# Patient Record
Sex: MALE | Race: WHITE | HISPANIC OR LATINO | Employment: UNEMPLOYED | URBAN - METROPOLITAN AREA
[De-identification: names, ages, dates, MRNs, and addresses within clinical notes are randomized per-mention and may not be internally consistent; named-entity substitution may affect disease eponyms.]

---
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
## 2024-08-12 ENCOUNTER — APPOINTMENT (OUTPATIENT)
Dept: CT IMAGING | Facility: CLINIC | Age: 19
End: 2024-08-12
Attending: EMERGENCY MEDICINE
Payer: COMMERCIAL

## 2024-08-12 ENCOUNTER — HOSPITAL ENCOUNTER (EMERGENCY)
Facility: CLINIC | Age: 19
Discharge: HOME | End: 2024-08-12
Attending: EMERGENCY MEDICINE | Admitting: EMERGENCY MEDICINE
Payer: COMMERCIAL

## 2024-08-12 ENCOUNTER — APPOINTMENT (OUTPATIENT)
Dept: ULTRASOUND IMAGING | Facility: CLINIC | Age: 19
End: 2024-08-12
Attending: EMERGENCY MEDICINE
Payer: COMMERCIAL

## 2024-08-12 VITALS
WEIGHT: 315 LBS | TEMPERATURE: 99.3 F | DIASTOLIC BLOOD PRESSURE: 106 MMHG | BODY MASS INDEX: 44.1 KG/M2 | HEART RATE: 81 BPM | HEIGHT: 71 IN | SYSTOLIC BLOOD PRESSURE: 196 MMHG | OXYGEN SATURATION: 96 % | RESPIRATION RATE: 18 BRPM

## 2024-08-12 DIAGNOSIS — J18.9 PNEUMONIA DUE TO INFECTIOUS ORGANISM, UNSPECIFIED LATERALITY, UNSPECIFIED PART OF LUNG: ICD-10-CM

## 2024-08-12 DIAGNOSIS — R06.02 SOB (SHORTNESS OF BREATH): ICD-10-CM

## 2024-08-12 LAB
ALBUMIN SERPL BCG-MCNC: 3.7 G/DL (ref 3.5–5.2)
ALP SERPL-CCNC: 96 U/L (ref 40–150)
ALT SERPL W P-5'-P-CCNC: 45 U/L (ref 0–70)
ANION GAP SERPL CALCULATED.3IONS-SCNC: 8 MMOL/L (ref 7–15)
AST SERPL W P-5'-P-CCNC: 25 U/L (ref 0–45)
BASOPHILS # BLD AUTO: 0 10E3/UL (ref 0–0.2)
BASOPHILS NFR BLD AUTO: 0 %
BILIRUB SERPL-MCNC: 0.5 MG/DL
BUN SERPL-MCNC: 9.5 MG/DL (ref 6–20)
CALCIUM SERPL-MCNC: 8.8 MG/DL (ref 8.8–10.4)
CHLORIDE SERPL-SCNC: 104 MMOL/L (ref 98–107)
CREAT SERPL-MCNC: 0.84 MG/DL (ref 0.67–1.17)
D DIMER PPP FEU-MCNC: 0.64 UG/ML FEU (ref 0–0.5)
EGFRCR SERPLBLD CKD-EPI 2021: >90 ML/MIN/1.73M2
EOSINOPHIL # BLD AUTO: 0.1 10E3/UL (ref 0–0.7)
EOSINOPHIL NFR BLD AUTO: 1 %
ERYTHROCYTE [DISTWIDTH] IN BLOOD BY AUTOMATED COUNT: 14 % (ref 10–15)
GLUCOSE SERPL-MCNC: 82 MG/DL (ref 70–99)
HCO3 SERPL-SCNC: 25 MMOL/L (ref 22–29)
HCT VFR BLD AUTO: 35.7 % (ref 40–53)
HGB BLD-MCNC: 11.5 G/DL (ref 13.3–17.7)
IMM GRANULOCYTES # BLD: 0 10E3/UL
IMM GRANULOCYTES NFR BLD: 0 %
LYMPHOCYTES # BLD AUTO: 1.8 10E3/UL (ref 0.8–5.3)
LYMPHOCYTES NFR BLD AUTO: 21 %
MCH RBC QN AUTO: 27 PG (ref 26.5–33)
MCHC RBC AUTO-ENTMCNC: 32.2 G/DL (ref 31.5–36.5)
MCV RBC AUTO: 84 FL (ref 78–100)
MONOCYTES # BLD AUTO: 0.6 10E3/UL (ref 0–1.3)
MONOCYTES NFR BLD AUTO: 7 %
NEUTROPHILS # BLD AUTO: 5.9 10E3/UL (ref 1.6–8.3)
NEUTROPHILS NFR BLD AUTO: 70 %
NRBC # BLD AUTO: 0 10E3/UL
NRBC BLD AUTO-RTO: 0 /100
NT-PROBNP SERPL-MCNC: 926 PG/ML (ref 0–450)
PLATELET # BLD AUTO: 312 10E3/UL (ref 150–450)
POTASSIUM SERPL-SCNC: 4.2 MMOL/L (ref 3.4–5.3)
PROT SERPL-MCNC: 7.2 G/DL (ref 6.4–8.3)
RBC # BLD AUTO: 4.26 10E6/UL (ref 4.4–5.9)
SARS-COV-2 RNA RESP QL NAA+PROBE: NEGATIVE
SODIUM SERPL-SCNC: 137 MMOL/L (ref 135–145)
TROPONIN T SERPL HS-MCNC: <6 NG/L
WBC # BLD AUTO: 8.5 10E3/UL (ref 4–11)

## 2024-08-12 PROCEDURE — 93970 EXTREMITY STUDY: CPT

## 2024-08-12 PROCEDURE — 85025 COMPLETE CBC W/AUTO DIFF WBC: CPT | Performed by: EMERGENCY MEDICINE

## 2024-08-12 PROCEDURE — 85379 FIBRIN DEGRADATION QUANT: CPT | Performed by: EMERGENCY MEDICINE

## 2024-08-12 PROCEDURE — 87635 SARS-COV-2 COVID-19 AMP PRB: CPT | Performed by: EMERGENCY MEDICINE

## 2024-08-12 PROCEDURE — 80053 COMPREHEN METABOLIC PANEL: CPT | Performed by: EMERGENCY MEDICINE

## 2024-08-12 PROCEDURE — 93005 ELECTROCARDIOGRAM TRACING: CPT

## 2024-08-12 PROCEDURE — 99285 EMERGENCY DEPT VISIT HI MDM: CPT | Mod: 25

## 2024-08-12 PROCEDURE — 250N000011 HC RX IP 250 OP 636: Performed by: EMERGENCY MEDICINE

## 2024-08-12 PROCEDURE — 71275 CT ANGIOGRAPHY CHEST: CPT

## 2024-08-12 PROCEDURE — 250N000009 HC RX 250: Performed by: EMERGENCY MEDICINE

## 2024-08-12 PROCEDURE — 36415 COLL VENOUS BLD VENIPUNCTURE: CPT | Performed by: EMERGENCY MEDICINE

## 2024-08-12 PROCEDURE — 83880 ASSAY OF NATRIURETIC PEPTIDE: CPT | Performed by: EMERGENCY MEDICINE

## 2024-08-12 PROCEDURE — 84484 ASSAY OF TROPONIN QUANT: CPT | Performed by: EMERGENCY MEDICINE

## 2024-08-12 RX ORDER — ONDANSETRON 4 MG/1
4 TABLET, ORALLY DISINTEGRATING ORAL EVERY 8 HOURS PRN
Qty: 12 TABLET | Refills: 0 | Status: SHIPPED | OUTPATIENT
Start: 2024-08-12

## 2024-08-12 RX ORDER — KETOROLAC TROMETHAMINE 10 MG/1
10 TABLET, FILM COATED ORAL EVERY 6 HOURS PRN
Qty: 12 TABLET | Refills: 0 | Status: SHIPPED | OUTPATIENT
Start: 2024-08-12

## 2024-08-12 RX ORDER — IOPAMIDOL 755 MG/ML
83 INJECTION, SOLUTION INTRAVASCULAR ONCE
Status: COMPLETED | OUTPATIENT
Start: 2024-08-12 | End: 2024-08-12

## 2024-08-12 RX ORDER — CEFUROXIME AXETIL 500 MG/1
500 TABLET ORAL 2 TIMES DAILY
Qty: 14 TABLET | Refills: 0 | Status: SHIPPED | OUTPATIENT
Start: 2024-08-12 | End: 2024-08-19

## 2024-08-12 RX ORDER — AZITHROMYCIN 250 MG/1
TABLET, FILM COATED ORAL
Qty: 6 TABLET | Refills: 0 | Status: SHIPPED | OUTPATIENT
Start: 2024-08-12 | End: 2024-08-17

## 2024-08-12 RX ADMIN — IOPAMIDOL 83 ML: 755 INJECTION, SOLUTION INTRAVENOUS at 21:29

## 2024-08-12 RX ADMIN — SODIUM CHLORIDE 100 ML: 9 INJECTION, SOLUTION INTRAVENOUS at 21:29

## 2024-08-12 ASSESSMENT — COLUMBIA-SUICIDE SEVERITY RATING SCALE - C-SSRS
2. HAVE YOU ACTUALLY HAD ANY THOUGHTS OF KILLING YOURSELF IN THE PAST MONTH?: YES
1. IN THE PAST MONTH, HAVE YOU WISHED YOU WERE DEAD OR WISHED YOU COULD GO TO SLEEP AND NOT WAKE UP?: NO
BASED ON RESPONSES TO C-SSRS QS 1-6, WHAT IS THE PATIENT'S OVERALL RISK RATING FOR SUICIDE: MODERATE RISK
4. HAVE YOU HAD THESE THOUGHTS AND HAD SOME INTENTION OF ACTING ON THEM?: NO
3. HAVE YOU BEEN THINKING ABOUT HOW YOU MIGHT KILL YOURSELF?: NO
5. HAVE YOU STARTED TO WORK OUT OR WORKED OUT THE DETAILS OF HOW TO KILL YOURSELF? DO YOU INTEND TO CARRY OUT THIS PLAN?: NO
6. HAVE YOU EVER DONE ANYTHING, STARTED TO DO ANYTHING, OR PREPARED TO DO ANYTHING TO END YOUR LIFE?: YES

## 2024-08-12 ASSESSMENT — ACTIVITIES OF DAILY LIVING (ADL)
ADLS_ACUITY_SCORE: 35

## 2024-08-12 NOTE — ED PROVIDER NOTES
"  Emergency Department Note      History of Present Illness     Chief Complaint   Shortness of Breath and Leg Swelling      HPI   Eileen Watt is a 18 year old male who presents to the ED for evaluation of shortness of breath and leg swelling. The patient reports that he has been he has been feeling pain go up both of his legs. He endorses chest pain and abdominal pain. The patient reports there is increased pain when he takes a deep breath. He states that he gets a bad cough when he breaths too quickly. Patient endorses having 2 fevers in the past week but denies currently having a fever. He has dealt with nausea and vomiting. Patient states that he has regular-use medications but has not taken them in a couple of months. He denies having pharyngitis the past couple of days but states that he dealt with pharyngitis for the week before that. He denies smoking, recent travel, or any history of blood clots.     Independent Historian   None    Review of External Notes   I reviewed her office visit from earlier today    Past Medical History     Medical History and Problem List   Major depressive disorder  PTSD  Anxiety    Medications   Desyrel  Adderall  Tessalon    Surgical History   Tonsillectomy    Physical Exam     Patient Vitals for the past 24 hrs:   BP Temp Temp src Pulse Resp SpO2 Height Weight   08/12/24 2213 (!) 196/106 -- -- 81 18 96 % -- --   08/12/24 1553 (!) 173/109 99.3  F (37.4  C) Temporal 89 18 95 % 1.803 m (5' 11\") (!) 226.8 kg (500 lb)     Physical Exam  Constitutional: Vital signs reviewed as above  General: Alert  HEENT: Moist mucous membranes  Eyes: Conjunctiva normal.   Neck: Normal range of motion  Cardiovascular: Regular rate, Regular rhythm and normal heart sounds.  No MRG  Pulmonary/Chest: Effort normal and breath sounds normal. No respiratory distress. Patient has no wheezes. Patient has no rales.   Abdominal: Soft. Positive bowel sounds. No MRG. Mild epigastric tenderness. Abdomen is " obese.   Musculoskeletal/Extremities: Full ROM. Tenderness to calves bilaterally. No obvious unilateral swelling.   Endo: No pitting edema  Neurological: Alert, no focal deficits.  Skin: No erythema or warmth.   Psychiatric: Pleasant     Diagnostics     Lab Results   Labs Ordered and Resulted from Time of ED Arrival to Time of ED Departure   NT PROBNP INPATIENT - Abnormal       Result Value    N terminal Pro BNP Inpatient 926 (*)    D DIMER QUANTITATIVE - Abnormal    D-Dimer Quantitative 0.64 (*)    CBC WITH PLATELETS AND DIFFERENTIAL - Abnormal    WBC Count 8.5      RBC Count 4.26 (*)     Hemoglobin 11.5 (*)     Hematocrit 35.7 (*)     MCV 84      MCH 27.0      MCHC 32.2      RDW 14.0      Platelet Count 312      % Neutrophils 70      % Lymphocytes 21      % Monocytes 7      % Eosinophils 1      % Basophils 0      % Immature Granulocytes 0      NRBCs per 100 WBC 0      Absolute Neutrophils 5.9      Absolute Lymphocytes 1.8      Absolute Monocytes 0.6      Absolute Eosinophils 0.1      Absolute Basophils 0.0      Absolute Immature Granulocytes 0.0      Absolute NRBCs 0.0     COMPREHENSIVE METABOLIC PANEL - Normal    Sodium 137      Potassium 4.2      Carbon Dioxide (CO2) 25      Anion Gap 8      Urea Nitrogen 9.5      Creatinine 0.84      GFR Estimate >90      Calcium 8.8      Chloride 104      Glucose 82      Alkaline Phosphatase 96      AST 25      ALT 45      Protein Total 7.2      Albumin 3.7      Bilirubin Total 0.5     TROPONIN T, HIGH SENSITIVITY - Normal    Troponin T, High Sensitivity <6     COVID-19 VIRUS (CORONAVIRUS) BY PCR - Normal    SARS CoV2 PCR Negative         Imaging   CT Chest Pulmonary Embolism w Contrast   Final Result   IMPRESSION:   1.  No pulmonary embolism.      2.  Multifocal infiltrates in the lungs including groundglass infiltrates and nodular infiltrates compatible with multifocal pneumonia. Mild mediastinal and hilar lymphadenopathy is likely reactive.      3.  Fatty liver.      US  Lower Extremity Venous Duplex Bilateral   Final Result   IMPRESSION:   1.  No deep venous thrombosis in the bilateral lower extremities.          EKG   ECG taken at 16:51, ECG read at 17:04  Normal sinus rhythm   Rate 76 bpm. MN interval 198 ms. QRS duration 82 ms. QT/QTc 386/434 ms. P-R-T axes 18 18 36.    Independent Interpretation   EKG shows sinus rhythm, rate of 76, no acute concerning ST or T wave changes    ED Course      Medications Administered   Medications   iopamidol (ISOVUE-370) solution 83 mL (83 mLs Intravenous $Given 8/12/24 2129)   Saline Flush (100 mLs Intravenous $Given 8/12/24 2129)       Procedures   None     Discussion of Management   None    ED Course   ED Course as of 08/12/24 2305   Mon Aug 12, 2024   1644 I obtained history and examined the patient as noted above.    1922 I rechecked and updated the patient.    1954 I rechecked and updated the patient.    2202 I rechecked and updated the patient.        Additional Documentation  None    Medical Decision Making / Diagnosis     CMS Diagnoses: None      MIPS: CT for PE was ordered because the patient had an abnormal d-dimer.       KARYN Watt is a 18 year old transgender male who presents with pleuritic chest discomfort, shortness of breath and leg pain and swelling.  Just had an EKG and a troponin which were negative at an outside facility.  Certainly PE is a possibility.  She is morbidly obese and does have tenderness to the calves bilaterally.  Fortunately ultrasounds were negative.  A D-dimer was ordered given her risk factors and that did come back elevated.  As such PE study was obtained.  While there is no evidence of PE she does have groundglass opacities concerning for early pneumonia.  Her CBC shows a low hemoglobin 11.5 but is otherwise unremarkable.  Comprehensive metabolic panel is normal.  Troponin is also undetectable making acute course and unlikely.  She does have a slight bump in her BNP to 926 but no obvious  effusions on her CT scan.  Plan to discharge her home on antibiotics for pneumonia.  I will also discharge her with some Toradol and Zofran.  Follow-up as symptoms warrant.    Disposition   The patient was discharged.     Diagnosis     ICD-10-CM    1. Pneumonia due to infectious organism, unspecified laterality, unspecified part of lung  J18.9       2. SOB (shortness of breath)  R06.02            Discharge Medications   Discharge Medication List as of 8/12/2024 10:10 PM        START taking these medications    Details   azithromycin (ZITHROMAX Z-DORON) 250 MG tablet Two tablets on the first day, then one tablet daily for the next 4 days, Disp-6 tablet, R-0, E-Prescribe      cefuroxime (CEFTIN) 500 MG tablet Take 1 tablet (500 mg) by mouth 2 times daily for 7 days, Disp-14 tablet, R-0, E-Prescribe      ketorolac (TORADOL) 10 MG tablet Take 1 tablet (10 mg) by mouth every 6 hours as needed for moderate pain, Disp-12 tablet, R-0, E-Prescribe      ondansetron (ZOFRAN ODT) 4 MG ODT tab Take 1 tablet (4 mg) by mouth every 8 hours as needed for nausea, Disp-12 tablet, R-0, E-Prescribe               Scribe Disclosure:  I, Hernesto Muhammad, am serving as a scribe at 4:42 PM on 8/12/2024 to document services personally performed by Larry Norman MD based on my observations and the provider's statements to me.        Larry Norman MD  08/12/24 5320

## 2024-08-13 LAB
ATRIAL RATE - MUSE: 76 BPM
DIASTOLIC BLOOD PRESSURE - MUSE: NORMAL MMHG
INTERPRETATION ECG - MUSE: NORMAL
P AXIS - MUSE: 18 DEGREES
PR INTERVAL - MUSE: 198 MS
QRS DURATION - MUSE: 82 MS
QT - MUSE: 386 MS
QTC - MUSE: 434 MS
R AXIS - MUSE: 18 DEGREES
SYSTOLIC BLOOD PRESSURE - MUSE: NORMAL MMHG
T AXIS - MUSE: 36 DEGREES
VENTRICULAR RATE- MUSE: 76 BPM

## 2025-04-16 ENCOUNTER — TELEPHONE (OUTPATIENT)
Dept: BEHAVIORAL HEALTH | Facility: CLINIC | Age: 20
End: 2025-04-16

## 2025-04-16 ENCOUNTER — HOSPITAL ENCOUNTER (INPATIENT)
Facility: CLINIC | Age: 20
End: 2025-04-16
Attending: EMERGENCY MEDICINE | Admitting: PSYCHIATRY & NEUROLOGY
Payer: MEDICAID

## 2025-04-16 DIAGNOSIS — F33.41 RECURRENT MAJOR DEPRESSIVE DISORDER, IN PARTIAL REMISSION: ICD-10-CM

## 2025-04-16 DIAGNOSIS — R45.851 SUICIDAL IDEATION: ICD-10-CM

## 2025-04-16 DIAGNOSIS — F41.9 ANXIETY: Primary | ICD-10-CM

## 2025-04-16 PROCEDURE — 250N000013 HC RX MED GY IP 250 OP 250 PS 637: Performed by: EMERGENCY MEDICINE

## 2025-04-16 PROCEDURE — 99285 EMERGENCY DEPT VISIT HI MDM: CPT | Performed by: EMERGENCY MEDICINE

## 2025-04-16 RX ORDER — OLANZAPINE 10 MG/1
10 TABLET, ORALLY DISINTEGRATING ORAL ONCE
Status: COMPLETED | OUTPATIENT
Start: 2025-04-16 | End: 2025-04-16

## 2025-04-16 RX ORDER — HYDROXYZINE HYDROCHLORIDE 25 MG/1
25 TABLET, FILM COATED ORAL EVERY 4 HOURS PRN
Status: DISCONTINUED | OUTPATIENT
Start: 2025-04-16 | End: 2025-04-17

## 2025-04-16 RX ORDER — OLANZAPINE 10 MG/1
10 TABLET, ORALLY DISINTEGRATING ORAL 2 TIMES DAILY PRN
Status: DISCONTINUED | OUTPATIENT
Start: 2025-04-16 | End: 2025-04-17

## 2025-04-16 RX ADMIN — OLANZAPINE 10 MG: 10 TABLET, ORALLY DISINTEGRATING ORAL at 23:44

## 2025-04-16 ASSESSMENT — COLUMBIA-SUICIDE SEVERITY RATING SCALE - C-SSRS
2. HAVE YOU ACTUALLY HAD ANY THOUGHTS OF KILLING YOURSELF IN THE PAST MONTH?: YES
3. HAVE YOU BEEN THINKING ABOUT HOW YOU MIGHT KILL YOURSELF?: YES
5. HAVE YOU STARTED TO WORK OUT OR WORKED OUT THE DETAILS OF HOW TO KILL YOURSELF? DO YOU INTEND TO CARRY OUT THIS PLAN?: YES
1. IN THE PAST MONTH, HAVE YOU WISHED YOU WERE DEAD OR WISHED YOU COULD GO TO SLEEP AND NOT WAKE UP?: YES
4. HAVE YOU HAD THESE THOUGHTS AND HAD SOME INTENTION OF ACTING ON THEM?: NO
6. HAVE YOU EVER DONE ANYTHING, STARTED TO DO ANYTHING, OR PREPARED TO DO ANYTHING TO END YOUR LIFE?: YES

## 2025-04-16 ASSESSMENT — ACTIVITIES OF DAILY LIVING (ADL)
ADLS_ACUITY_SCORE: 41

## 2025-04-17 ENCOUNTER — TELEPHONE (OUTPATIENT)
Dept: BEHAVIORAL HEALTH | Facility: CLINIC | Age: 20
End: 2025-04-17

## 2025-04-17 VITALS
HEIGHT: 71 IN | TEMPERATURE: 97.7 F | DIASTOLIC BLOOD PRESSURE: 83 MMHG | WEIGHT: 315 LBS | RESPIRATION RATE: 18 BRPM | SYSTOLIC BLOOD PRESSURE: 137 MMHG | BODY MASS INDEX: 44.1 KG/M2 | OXYGEN SATURATION: 95 % | HEART RATE: 121 BPM

## 2025-04-17 PROBLEM — F32.A DEPRESSION: Status: ACTIVE | Noted: 2025-04-17

## 2025-04-17 PROBLEM — R45.851 SUICIDAL IDEATION: Status: ACTIVE | Noted: 2025-04-17

## 2025-04-17 LAB
AMPHETAMINES UR QL SCN: NORMAL
BARBITURATES UR QL SCN: NORMAL
BENZODIAZ UR QL SCN: NORMAL
BZE UR QL SCN: NORMAL
CANNABINOIDS UR QL SCN: NORMAL
CHOLEST SERPL-MCNC: 153 MG/DL
EST. AVERAGE GLUCOSE BLD GHB EST-MCNC: 120 MG/DL
FENTANYL UR QL: NORMAL
HBA1C MFR BLD: 5.8 %
HDLC SERPL-MCNC: 48 MG/DL
LDLC SERPL CALC-MCNC: 84 MG/DL
NONHDLC SERPL-MCNC: 105 MG/DL
OPIATES UR QL SCN: NORMAL
PCP QUAL URINE (ROCHE): NORMAL
TRIGL SERPL-MCNC: 107 MG/DL

## 2025-04-17 PROCEDURE — 250N000013 HC RX MED GY IP 250 OP 250 PS 637: Performed by: CLINICAL NURSE SPECIALIST

## 2025-04-17 PROCEDURE — 99223 1ST HOSP IP/OBS HIGH 75: CPT | Performed by: CLINICAL NURSE SPECIALIST

## 2025-04-17 PROCEDURE — 80307 DRUG TEST PRSMV CHEM ANLYZR: CPT | Performed by: EMERGENCY MEDICINE

## 2025-04-17 PROCEDURE — 80061 LIPID PANEL: CPT | Performed by: PSYCHIATRY & NEUROLOGY

## 2025-04-17 PROCEDURE — 36415 COLL VENOUS BLD VENIPUNCTURE: CPT | Performed by: PSYCHIATRY & NEUROLOGY

## 2025-04-17 PROCEDURE — 83036 HEMOGLOBIN GLYCOSYLATED A1C: CPT | Performed by: PSYCHIATRY & NEUROLOGY

## 2025-04-17 PROCEDURE — 128N000002 HC R&B CD/MH ADOLESCENT

## 2025-04-17 RX ORDER — HYDROXYZINE HYDROCHLORIDE 25 MG/1
25 TABLET, FILM COATED ORAL EVERY 4 HOURS PRN
Status: DISCONTINUED | OUTPATIENT
Start: 2025-04-17 | End: 2025-04-17

## 2025-04-17 RX ORDER — OLANZAPINE 5 MG/1
5-10 TABLET, FILM COATED ORAL 3 TIMES DAILY PRN
Status: DISCONTINUED | OUTPATIENT
Start: 2025-04-17 | End: 2025-04-21 | Stop reason: HOSPADM

## 2025-04-17 RX ORDER — AMOXICILLIN 250 MG
1 CAPSULE ORAL 2 TIMES DAILY PRN
Status: DISCONTINUED | OUTPATIENT
Start: 2025-04-17 | End: 2025-04-21 | Stop reason: HOSPADM

## 2025-04-17 RX ORDER — OLANZAPINE 10 MG/1
10 TABLET, FILM COATED ORAL 3 TIMES DAILY PRN
Status: DISCONTINUED | OUTPATIENT
Start: 2025-04-17 | End: 2025-04-17

## 2025-04-17 RX ORDER — MAGNESIUM HYDROXIDE/ALUMINUM HYDROXICE/SIMETHICONE 120; 1200; 1200 MG/30ML; MG/30ML; MG/30ML
30 SUSPENSION ORAL EVERY 4 HOURS PRN
Status: DISCONTINUED | OUTPATIENT
Start: 2025-04-17 | End: 2025-04-21 | Stop reason: HOSPADM

## 2025-04-17 RX ORDER — HYDROXYZINE HYDROCHLORIDE 25 MG/1
25-50 TABLET, FILM COATED ORAL EVERY 4 HOURS PRN
Status: DISCONTINUED | OUTPATIENT
Start: 2025-04-17 | End: 2025-04-21 | Stop reason: HOSPADM

## 2025-04-17 RX ORDER — ESCITALOPRAM OXALATE 10 MG/1
10 TABLET ORAL DAILY
Status: DISCONTINUED | OUTPATIENT
Start: 2025-04-17 | End: 2025-04-21 | Stop reason: HOSPADM

## 2025-04-17 RX ORDER — OLANZAPINE 10 MG/2ML
10 INJECTION, POWDER, FOR SOLUTION INTRAMUSCULAR 3 TIMES DAILY PRN
Status: DISCONTINUED | OUTPATIENT
Start: 2025-04-17 | End: 2025-04-17

## 2025-04-17 RX ORDER — OLANZAPINE 10 MG/2ML
10 INJECTION, POWDER, FOR SOLUTION INTRAMUSCULAR 3 TIMES DAILY PRN
Status: DISCONTINUED | OUTPATIENT
Start: 2025-04-17 | End: 2025-04-21 | Stop reason: HOSPADM

## 2025-04-17 RX ORDER — TRAZODONE HYDROCHLORIDE 50 MG/1
50 TABLET ORAL
Status: DISCONTINUED | OUTPATIENT
Start: 2025-04-17 | End: 2025-04-21 | Stop reason: HOSPADM

## 2025-04-17 RX ORDER — ACETAMINOPHEN 325 MG/1
650 TABLET ORAL EVERY 4 HOURS PRN
Status: DISCONTINUED | OUTPATIENT
Start: 2025-04-17 | End: 2025-04-21 | Stop reason: HOSPADM

## 2025-04-17 RX ADMIN — ESCITALOPRAM OXALATE 10 MG: 10 TABLET ORAL at 14:06

## 2025-04-17 RX ADMIN — HYDROXYZINE HYDROCHLORIDE 25 MG: 25 TABLET, FILM COATED ORAL at 19:37

## 2025-04-17 ASSESSMENT — ACTIVITIES OF DAILY LIVING (ADL)
ADLS_ACUITY_SCORE: 41
ADLS_ACUITY_SCORE: 15
ADLS_ACUITY_SCORE: 15
ADLS_ACUITY_SCORE: 41
ADLS_ACUITY_SCORE: 41
ADLS_ACUITY_SCORE: 15
DRESS: STREET CLOTHES
ADLS_ACUITY_SCORE: 41
ADLS_ACUITY_SCORE: 15
ADLS_ACUITY_SCORE: 41
ADLS_ACUITY_SCORE: 15
ADLS_ACUITY_SCORE: 41
ADLS_ACUITY_SCORE: 15
ADLS_ACUITY_SCORE: 41
ADLS_ACUITY_SCORE: 15
ADLS_ACUITY_SCORE: 41
ADLS_ACUITY_SCORE: 15
ADLS_ACUITY_SCORE: 41
ADLS_ACUITY_SCORE: 15
ADLS_ACUITY_SCORE: 15
ADLS_ACUITY_SCORE: 41
ADLS_ACUITY_SCORE: 15

## 2025-04-17 NOTE — ED TRIAGE NOTES
Pt endorses si with a plan- had pesticides in door dash cart that she was planning to drink. Started scratching self with a fork yesterday in places not visible- no open areas.  States she has a lot of stressor in life- is unemployed and lost a lot of friends. Doesn't have insurance and can't afford meds     Triage Assessment (Adult)       Row Name 04/16/25 1956          Triage Assessment    Airway WDL WDL        Respiratory WDL    Respiratory WDL WDL        Skin Circulation/Temperature WDL    Skin Circulation/Temperature WDL WDL        Peripheral/Neurovascular WDL    Peripheral Neurovascular WDL WDL        Cognitive/Neuro/Behavioral WDL    Cognitive/Neuro/Behavioral WDL mood/behavior     Mood/Behavior sad;anxious

## 2025-04-17 NOTE — CONSULTS
"Diagnostic Evaluation Consultation  Crisis Assessment    Patient Name: Eileen Watt  Age:  19 year old  Legal Sex: male  Gender Identity: male  Pronouns:   Race: White  Ethnicity:  or   Language: English      Patient was assessed: In person   Crisis Assessment Start Date: 25  Crisis Assessment Start Time:   Crisis Assessment Stop Time:   Patient location: [facility]                             [id number]    Referral Data and Chief Complaint  Eileen Watt presents to the ED with family/friends. Patient is presenting to the ED for the following concerns: Suicidal ideation, Depression, Anxiety. Factors that make the mental health crisis life threatening or complex are: Patient brought in by mother due to SI with plans and intent to ingest pesticides.  Patient researched and added pesticides to her door dash cart, but called her mother to take her in.  Patient's last suicide attempt was 6 years ago.  Patient performed NSSI by scratching arm with a plastic knife.  Patient denied HI.  Patient was mildly anxious, but friendly  and cooperative.  She was not aggressive. She said, \"I'm afraid of suffering and I want a quick way to end it.  I got distracted for a moment by a bunch of drama going on with friends.  Two of my friends have been together, but they didn't tell me. My girlfriend of two years broke up with me almost two weeks ago.  I lost my job and I'm on unemployment that's about to run out.  I'm worried about money.  I'm stressed about the state of the world.  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  I haven't been able to afford mental health care.  My dad , last year.  I entire life has been a series of horrible things, most of which are my fault.\"  Patient has not been sleeping well.  She has nightmares and flashbacks of things related to her father; as well as, other scary prospects.  Patient did not have psychosis symptoms.  She feels increased " paranoia, especially about bugs and rodents.  Patient has not been eating well.  She weighs 500 pounds.  She has many aches and pains.  Her insight, judgment, and impulse control were impaired..      Informed Consent and Assessment Methods  Explained the crisis assessment process, including applicable information disclosures and limits to confidentiality, assessed understanding of the process, and obtained consent to proceed with the assessment.  Assessment methods included conducting a formal interview with patient, review of medical records, collaboration with medical staff, and obtaining relevant collateral information from family and community providers when available.  : done     History of the Crisis   Patient had prior diagnoses of Depression, ADHD, and mom said a history of Delusional d/o.  Patient has not been taking meds for over one hour.    Brief Psychosocial History  Family:  Single, Children no  Support System:  Parent(s), Friend  Employment Status:  unemployed  Source of Income:  unemployment  Financial Environmental Concerns:  unemployed  Current Hobbies:  social media/computer activities  Barriers in Personal Life:  mental health concerns    Significant Clinical History  Current Anxiety Symptoms:  panic attack, racing thoughts, excessive worry, shortness of breath or racing heart, anxious  Current Depression/Trauma:  avoidance, sense of doom, difficulty concentrating, withdrawl/isolation, negativistic, crying or feels like crying, low self esteem, impaired decision making, helplessness, hopelessness, sadness, excessive guilt, thoughts of death/suicide  Current Somatic Symptoms:  sweating, flushing, shaking, somatic symptoms (abdominal pain, headache, tension), racing thoughts, excessive worry, shortness of breath or racing heart, anxious  Current Psychosis/Thought Disturbance:  forgetful, impulsive, inattentive, displaces blame, distractability  Current Eating Symptoms:  loss of appetite, increased  "appetite, recent weight gain, binging  Chemical Use History:  Alcohol: None  Benzodiazepines: None  Opiates: None  Cocaine: None  Marijuana: None  Other Use: None   Past diagnosis:  ADHD, Depression  Family history:  Depression  Past treatment:  Individual therapy, Primary Care, Inpatient Hospitalization, Partial Hospitalization, Day Treatment  Details of most recent treatment:  Patient has not been on meds or seeing a therapist since 8/2024.  Other relevant history:  Patient lives alone.  She reportedly does better, when she lives with others.    Have there been any medication changes in the past two weeks:  patient is not on psychiatric meds       Is the patient compliant with medications:  no        Collateral Information  Is there collateral information: Yes     Collateral information name, relationship, phone number:  Shannan Watt, mother, [phone], in person    What happened today: \"Shelley is depessed.  This is her last month of unemployment.  Her weight goes up.  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  She's too tired and depressed to cook.  She'll go days without eating. I think she might have Autism and Lalo Danlos symptoms.\"     What is different about patient's functioning: Mom said, \"I've been worried about her since she was 13 years old.  The last year has been hard.  She has nowhere to go.  She can only be at my house, about two days.  She's so sad and lonely.\"     What do you think the patient needs:  admission    Has patient made comments about wanting to kill themselves/others: yes    If d/c is recommended, can they take part in safety/aftercare planning:  yes    Additional collateral information:        Risk Assessment  [place] Suicide Severity Rating Scale Full Clinical Version:  Suicidal Ideation  Q1 Wish to be Dead (Lifetime): Yes  Q2 Non-Specific Active Suicidal Thoughts (Lifetime): Yes  3. Active Suicidal Ideation with any Methods (Not Plan) Without Intent to Act (Lifetime): " Yes  4. Active Suicidal Ideation with Some Intent to Act, Without Specific Plan (Lifetime): Yes  5. Active Suicidal Ideation with Specific Plan and Intent (Lifetime): Yes  Q6 Suicide Behavior (Lifetime): yes  Intensity of Ideation (Lifetime)  Most Severe Ideation Rating (Lifetime): 5  Frequency (Lifetime): Many times each day  Duration (Lifetime): Less than 1 hour/some of the time  Controllability (Lifetime): Unable to control thoughts  Deterrents (Lifetime): Deterrents most likely did not stop you  Reasons for Ideation (Lifetime): Completely to end or stop the pain (You couldn't go on living with the pain or how you were feeling)  Suicidal Behavior (Lifetime)  Actual Attempt (Lifetime): Yes  Total Number of Actual Attempts (Lifetime): 1  Actual Attempt Description (Lifetime): 6 years ago  Has subject engaged in non-suicidal self-injurious behavior? (Lifetime): Yes  Interrupted Attempts (Lifetime): No  Aborted or Self-Interrupted Attempt (Lifetime): No  Preparatory Acts or Behavior (Lifetime): Yes    [place] Suicide Severity Rating Scale Recent:   Suicidal Ideation (Recent)  Q1 Wished to be Dead (Past Month): yes  Q2 Suicidal Thoughts (Past Month): yes  Q3 Suicidal Thought Method: yes  Q4 Suicidal Intent without Specific Plan: yes  Q5 Suicide Intent with Specific Plan: yes  If yes to Q6, within past 3 months?: no  Level of Risk per Screen: high risk  Intensity of Ideation (Recent)  Most Severe Ideation Rating (Past 1 Month): 5  Frequency (Past 1 Month): Many times each day  Duration (Past 1 Month): 1-4 hours/a lot of time  Controllability (Past 1 Month): Unable to control thoughts  Deterrents (Past 1 Month): Deterrents most likely did not stop you  Reasons for Ideation (Past 1 Month): Completely to end or stop the pain (You couldn't go on living with the pain or how you were feeling)  Suicidal Behavior (Recent)  Actual Attempt (Past 3 Months): No  Has subject engaged in non-suicidal self-injurious behavior? (Past 3  Months): Yes  Interrupted Attempts (Past 3 Months): No  Aborted or Self-Interrupted Attempt (Past 3 Months): No  Preparatory Acts or Behavior (Past 3 Months): Yes    Environmental or Psychosocial Events: challenging interpersonal relationships, other life stressors, excessive debt, poor finances, unemployment/underemployment, helplessness/hopelessness, social isolation  Protective Factors: Protective Factors: strong bond to family unit, community support, or employment, lives in a responsibly safe and stable environment, help seeking    Does the patient have thoughts of harming others? Feels Like Hurting Others: no  Previous Attempt to Hurt Others: no  Is the patient engaging in sexually inappropriate behavior?: no  Does Patient have a known history of aggressive behavior: No  Has aggression occurred as a result of MH concerns/diagnosis: no  Does patient have history of aggression in hospital: no    Is the patient engaging in sexually inappropriate behavior?  no        Mental Status Exam   Affect: Appropriate, Flat, Blunted  Appearance: Appropriate, Disheveled  Attention Span/Concentration: Attentive  Eye Contact: Variable, Engaged    Fund of Knowledge: Appropriate   Language /Speech Content: Fluent  Language /Speech Volume: Normal  Language /Speech Rate/Productions: Normal  Recent Memory: Intact  Remote Memory: Intact  Mood: Anxious, Depressed, Sad  Orientation to Person: Yes   Orientation to Place: Yes  Orientation to Time of Day: Yes  Orientation to Date: Yes     Situation (Do they understand why they are here?): Yes  Psychomotor Behavior: Normal  Thought Content: Suicidal, Paranoia  Thought Form: Intact, Paranoia     Medication  Psychotropic medications:   Medication Orders - Psychiatric (From admission, onward)      Start     Dose/Rate Route Frequency Ordered Stop    04/16/25 2329  OLANZapine zydis (zyPREXA) ODT tab 10 mg         10 mg Oral 2 TIMES DAILY PRN 04/16/25 2329 04/16/25 2329  hydrOXYzine HCl  (ATARAX) tablet 25 mg         25 mg Oral EVERY 4 HOURS PRN 04/16/25 9366               Current Care Team  Patient Care Team:  No Ref-Primary, Physician as PCP - General    Diagnosis  Patient Active Problem List   Diagnosis Code    Depression F32.A       Primary Problem This Admission  Active Hospital Problems    *Depression        Clinical Summary and Substantiation of Recommendations   Clinical Substantiation:  It is the recommendation of this clinician that pt admit to IP MH for safety and stabilization. Pt displays the following risk factors that support IP admission: Si with plans and intent for ingestion of pesticides. Pt is unable to engage in safety planning to mitigate risk level in a non-secure setting. Lower levels of care would not be sufficient in managing the level of risk pt is presenting with. Due to this IP is the least restrictive option of care for pt. Pt should remain in IP until deemed safe to return to the community and engage in OP  supports. Pt will need assistance establishing OP  services prior to discharge.    Goals for crisis stabilization:  cessation of SI    Next steps for Care Team:  Psych consult    Treatment Objectives Addressed:  rapport building, assessing safety, identifying treatment goals, identifying additional supports, processing feelings, exploring obstacles to safety in the community    Therapeutic Interventions:  Engaged in safety planning, Engaged in guided discovery, explored patient's perspectives and helped expand them through socratic dialogue.    Has a specific means been identified for suicidal/homicide actions: Yes    If yes, describe:  pesticide injestion    Explain action steps toward mitigation:  admission    Document completion of mitigation actions:       The follow up action still needed prior to discharge:  secure substances, upon discharge    Patient coping skills attempted to reduce the crisis:  Talking to friends    Disposition  Recommended referrals:  Other. please comment (inpatient mental health)        Reviewed case and recommendations with attending provider. Attending Name: Moises Sparks MD       Attending concurs with disposition: yes       Patient and/or validated legal guardian concurs with disposition:   yes       Final disposition:  inpatient mental health         Imminent risk of harm: Suicidal Behavior  Severe psychiatric, behavioral or other comorbid conditions are appropriate for management at inpatient mental health as indicated by at least one of the following: Psychiatric Symptoms, Impaired impulse control, judgement, or insight, Symptoms of impact to function  Severe dysfunction in daily living is present as indicated by at least one of the following: Extreme deterioration in social interactions  Situation and expectations are appropriate for inpatient care: Voluntary treatment at lower level of care is not feasible  Inpatient mental health services are necessary to meet patient needs and at least one of the following: Specific condition related to admission diagnosis is present and judged likely to further improve at proposed level of care, Specific condition related to admission diagnosis is present and judged likely to deteriorate in absence of treatment at proposed level of care      Legal status: Voluntary/Patient has signed consent for treatment                                                                                                                                 Reviewed court records: yes       Assessment Details   Total duration spent with the patient: 60 min     CPT code(s) utilized: 92703 - Psychotherapy for Crisis - 60 (30-74*) min    ARTI Thompson, Psychotherapist  [facility] - Triage & Transition Services  Callback: [phone]
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
## 2025-04-17 NOTE — PLAN OF CARE
Goal Outcome Evaluation:    Pt was met in the room   Cooperative yet restricted   Flat/blunted affect with minimal eye contact   Pt stated being admitted for suicidal ideation. Denied any active SI thoughts at the time of check in. Pt contracts for safety on the unit   Endorsed some anxiety due to being admitted and finds comfort staying in the room    Ate 100% of dinner in the room  Encouraged to be present in the milieu though was not as receptive.      Pt denied any pain or medical concern   There is a urine drug screen ordered-cup given to patient. Sent down to lab    Pt did not have any scheduled medication this evening   Encouraged to utilize PRN when needed. PRN Hydroxyzine 25 mg given per request

## 2025-04-17 NOTE — PROGRESS NOTES
04/17/25 1238   Patient Belongings   Did you bring any home meds/supplements to the hospital?  No   Patient Belongings sent to security per site process;locker;remains with patient   Patient Belongings Remaining with Patient clothing   Patient Belongings Put in Hospital Secure Location (Security or Locker, etc.) cash/credit card;money (see comment);purse/wallet;cell phone/electronics;shoes   Belongings Search Yes   Clothing Search Yes   Second Staff Lena SALDIVAR     Android phone  Slipper shoes  Long sleeve shirt  Underwear   Pants  Phone case wallet with ID  Purse with: 4 bus cards, $3.00, 3 chapsticks, 1 pen, guitar pick    Sent to Security:  [facility] card [id number]  Visa [phone]  EBT [id number]  [facility] card [id number]  [place] [phone]    ..A               Admission:  I am responsible for any personal items that are not sent to the safe or pharmacy.  [facility] is not responsible for loss, theft or damage of any property in my possession.    Signature:  _________________________________ Date: _______  Time: _____                                              Staff Signature:  ____________________________ Date: ________  Time: _____      2nd Staff person, if patient is unable/unwilling to sign:    Signature: ________________________________ Date: ________  Time: _____     Discharge:  [facility] has returned all of my personal belongings:    Signature: _________________________________ Date: ________  Time: _____                                          Staff Signature:  ____________________________ Date: ________  Time: _____

## 2025-04-17 NOTE — TELEPHONE ENCOUNTER
S: [facility] Antony , DEC  Keely  calling at 11:24 PM about 19 year old/male To F  presenting with SI with a plan      B: Pt arrived via Family. Presenting problem, stressors: Pt has SI with a plan to order pesticides through door dash. Pt's stressors are a recent breakup and job loss.     Pt affect in ED: Cooperative   Pt Dx: Major Depressive Disorder and ADHD  Previous IPMH hx? Yes: 6 years ago   Pt endorses SI with a plan to use pesticides     Hx of suicide attempt? Yes: 6 years ago   Pt endorses SIB via scratching , most recent episode 24 hours ago   Pt denies HI   Pt denies hallucinations .   Pt RARS Score: 3    Hx of aggression/violence, sexual offenses, legal concerns, Epic care plan? describe: n/a   Current concerns for aggression this visit? No  Does pt have a history of Civil Commitment? No  Is Pt their own guardian? Yes    Pt is not prescribed medication. Is patient medication compliant? N/A  Pt denies OP services   CD concerns: None  Acute or chronic medical concerns: 500 lbs   Does Pt present with specific needs, assistive devices, or exclusionary criteria? None      Pt is ambulatory  Pt is able to perform ADLs independently      A: Pt to be reviewed for [facility] admission. Pt is Voluntary  Preferred placement: [facility]    COVID Symptoms: No  If yes, COVID test required   Utox: Ordered, not yet collected   CMP: Not ordered, intake requested lab  CBC: Not ordered, intake requested lab  HCG: N/A    R: Patient cleared and ready for behavioral bed placement: Yes  Pt placed on IP worklist? Yes    Does Patient need a Transfer Center request created? No, Pt is located within [facility] ED, [facility] ED, or [facility] ED

## 2025-04-17 NOTE — TELEPHONE ENCOUNTER
R: Patient cleared and ready for behavioral bed placement: Yes    12:57 AM Intake called [facility] and spoke with pt coordinator, Becca.    1:30 AM Intake received a call from Dr. Tres Escamilla, accepting this pt to [facility](Sy[facility]).     1:41 AM Indicia complete.     1:49 AM Intake called st [facility] and provided disposition to Subha STEARNS. Nurse report: Charge has contacted ANS due to pt's BP. ANS went to ED and spoke with the provider to work on stabilization of pt's BP. Per ANS, ED provider will do a medical consult to figure out a plan and get the pt on medications. Additionally, the charge is requiring the pt to have an SIO due to SIB via scratching, last event was less than 24 hours ago.     1:51 AM Intake called [facility] ED and provided placement information to RNRupal.                 R: [place] [facility] Access Inpatient Bed Call Log  4/17/2025 12:04 AM  Intake has called facilities that have not updated their bed status within the last 12 hours.    Adults:    *[facility]:  [place] -- [facility]: @ CAPACITY.  [facility]-[id number]: @ CAPACITY. Reporting no reviews overnight.   [facility]- [id number]: @ CAPACITY. Low acuity   [facility]- [id number]: @ CAPACITY. Low acuity only -12:06 AM Per Abdiaziz, they can review low acuity.  [place] -- [facility]- [phone]: @ CAPACITY.   [facility]- [id number]: @ CAPACITY.   [facility]/ beds- [id number]: @ CAPACITY. Ages 18-35, Voluntary only, NO aggression/physical/sexual assault, violence hx or drug abuse, or psychosis. Negative Covid. -12:06 AM Per Leigh YA: 2, Adol: Can review for discharges tomorrow, Child: 0.   [facility]- [phone]: @ CAPACITY.  [phone] @ [facility].  [place] -- [facility]- [phone]: @ POSTING 1 BEDS. Do not review overnight.     Pt remains on waitlist pending appropriate placement availability.

## 2025-04-17 NOTE — PROGRESS NOTES
"S) 19-yr-old  male that prefers She/her pronouns voluntarily admitted to 6A Young Adult for psychiatric stabilization     B) Per DEC , Keely Puri [facility] , Licensed Social Worker on 2025  9:08 PM:     Eileen Watt presents to the ED with family/friends. Patient is presenting to the ED for the following concerns: Suicidal ideation, Depression, Anxiety. Factors that make the mental health crisis life threatening or complex are: Patient brought in by mother due to SI with plans and intent to ingest pesticides.  Patient researched and added pesticides to her door dash cart, but called her mother to take her in.  Patient's last suicide attempt was 6 years ago.  Patient performed NSSI by scratching arm with a plastic knife.  Patient denied HI.  Patient was mildly anxious, but friendly  and cooperative.  She was not aggressive. She said, \"I'm afraid of suffering and I want a quick way to end it.  I got distracted for a moment by a bunch of drama going on with friends.  Two of my friends have been together, but they didn't tell me. My girlfriend of two years broke up with me almost two weeks ago.  I lost my job and I'm on unemployment that's about to run out.  I'm worried about money.  I'm stressed about the state of the world.  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  I haven't been able to afford mental health care.  My dad , last year.  I entire life has been a series of horrible things, most of which are my fault.\"  Patient has not been sleeping well.  She has nightmares and flashbacks of things related to her father; as well as, other scary prospects.  Patient did not have psychosis symptoms.  She feels increased paranoia, especially about bugs and rodents.  Patient has not been eating well.  She weighs 500 pounds.  She has many aches and pains.  Her insight, judgment, and impulse control were impaired.     Patient had prior diagnoses of Depression, ADHD, and mom said a history " "of Delusional d/o.  Patient has not been taking meds for over one hour.     Significant Clinical History     Current Anxiety Symptoms:  panic attack, racing thoughts, excessive worry, shortness of breath or racing heart, anxious     Current Depression/Trauma:  avoidance, sense of doom, difficulty concentrating, withdrawl/isolation, negativistic, crying or feels like crying, low self esteem, impaired decision making, helplessness, hopelessness, sadness, excessive guilt, thoughts of death/suicide     Current Somatic Symptoms:  sweating, flushing, shaking, somatic symptoms (abdominal pain, headache, tension), racing thoughts, excessive worry, shortness of breath or racing heart, anxious     Current Psychosis/Thought Disturbance:  forgetful, impulsive, inattentive, displaces blame, distractability     Current Eating Symptoms:  loss of appetite, increased appetite, recent weight gain, binging     Chemical Use History:  Alcohol: None     Past diagnosis:  ADHD, Depression     Family history:  Depression     Past treatment:  Individual therapy, Primary Care, Inpatient Hospitalization, Partial Hospitalization, Day Treatment     Details of most recent treatment: Patient has not been on meds or seeing a therapist since 8/2024.     Other relevant history:  Patient lives alone.  She reportedly does better, when she lives with others.      Have there been any medication changes in the past two weeks:  patient is not on psychiatric meds         Collateral information name, relationship, phone number:  Shannan Watt, mother, [phone], in person      What happened today: \"Shelley is depressed.  This is her last month of unemployment.  Her weight goes up.  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  She's too tired and depressed to cook.  She'll go days without eating. I think she might have Autism and Lalo Danlos symptoms.\"       What is different about patient's functioning: Mom said, \"I've been worried about her since she " "was 13 years old.  The last year has been hard.  She has nowhere to go.  She can only be at my house, about two days.  She's so sad and lonely.\"       What do you think the patient needs:  admission      Has patient made comments about wanting to kill themselves/others: yes     Clinical Summary and Substantiation of Recommendations      Clinical Substantiation:  It is the recommendation of this clinician that pt admit to IP MH for safety and stabilization. Pt displays the following risk factors that support IP admission: Si with plans and intent for ingestion of pesticides. Pt is unable to engage in safety planning to mitigate risk level in a non-secure setting. Lower levels of care would not be sufficient in managing the level of risk pt is presenting with. Due to this IP is the least restrictive option of care for pt. Pt should remain in IP until deemed safe to return to the community and engage in OP MH supports. Pt will need assistance establishing OP MH services prior to discharge.     Goals for crisis stabilization:  cessation of SI     A) Pt who prefers to be called  Shelley  arrives to the unit at 1145 from the [facility] ED; pt ambulates independently, presents as depressed, unkempt, flat/sad affect, hesitant, currently endorses anxiety and SI thoughts only-contracts for safety while in the hospital, \"I'm not going to do anything in the hospital, that's why I am here,\" Pt denies any hallucinations, allergies to medications, denies any physical pain. Last alcohol use was 12/6/2024-\"stopped because my ankles were swelling,\" pt denies any nicotine or other chemical use. Pt denies any medical concerns.  VSS, cooperative with the search; due to obesity, pt unable to wear 5x size scrub pants, provider consulted for order for pt to wear own pants. No open areas, bruises, rashes or any other NSSIB marks or scars; \"I get cysts on my inner thighs sometimes but okay now,\" pt responds. Pt has labs drawn and informed of " "needed urine sample. Pt alert and oriented x 4, struggling with negativity and intrusive thoughts, isolating and not taking care of self. Pt denies any concerns with anger, \"stuff my feelings or shut down,\" pt elaborates. Pt given tour of room, unit and meets with provider/CTC.    R) Monitor closely to ensure safely adjusting to unit, no roommate, room close to nursing desk/lounge, 15-minute checks, safety bedding and suicide, self injury and sexual precautions for safety      2:46 PM  Pt likes to play musical instruments, guitar, starts Lexapro 10 mg without incident today, appetite 100% and drinking fluids; pt given journal, fidget, word finds/crossword sheets and is currently doing her laundry. ANS is looking for larger scrub attire for pt. Okay for pt to wear own clothes per provider due to obesity and bariatric size scrubs unavailable.  "

## 2025-04-17 NOTE — PLAN OF CARE
Eileen Watt  April 17, 2025  Plan of Care Hand-off Note     Patient Recommended Care Path: inpatient mental health    Clinical Substantiation:  It is the recommendation of this clinician that pt admit to IP MH for safety and stabilization. Pt displays the following risk factors that support IP admission: Si with plans and intent for ingestion of pesticides. Pt is unable to engage in safety planning to mitigate risk level in a non-secure setting. Lower levels of care would not be sufficient in managing the level of risk pt is presenting with. Due to this IP is the least restrictive option of care for pt. Pt should remain in IP until deemed safe to return to the community and engage in OP  supports. Pt will need assistance establishing OP MH services prior to discharge.    Goals for crisis stabilization:  cessation of SI    Next steps for Care Team:  Psych consult    Treatment Objectives Addressed:  rapport building, assessing safety, identifying treatment goals, identifying additional supports, processing feelings, exploring obstacles to safety in the community    Therapeutic Interventions:  Engaged in safety planning, Engaged in guided discovery, explored patient's perspectives and helped expand them through socratic dialogue.    Has a specific means been identified for suicidal.homicide actions: Yes  If yes, describe: pesticide injestion  Explain action steps toward mitigation: admission  Document completion of mitigation action:    The follow up action still needed prior to discharge: secure substances, upon discharge    Patient coping skills attempted to reduce the crisis:  Talking to friends       Imminent risk of harm: Suicidal Behavior  Severe psychiatric, behavioral or other comorbid conditions are appropriate for management at inpatient mental health as indicated by at least one of the following: Psychiatric Symptoms, Impaired impulse control, judgement, or insight, Symptoms of impact to  function  Severe dysfunction in daily living is present as indicated by at least one of the following: Extreme deterioration in social interactions  Situation and expectations are appropriate for inpatient care: Voluntary treatment at lower level of care is not feasible  Inpatient mental health services are necessary to meet patient needs and at least one of the following: Specific condition related to admission diagnosis is present and judged likely to further improve at proposed level of care, Specific condition related to admission diagnosis is present and judged likely to deteriorate in absence of treatment at proposed level of care      Collateral contact information:  Shannan Watt, mother, [phone], in person    Legal Status: Voluntary/Patient has signed consent for treatment                                                                                                                                 Reviewed court records: yes     Psychiatry Consult:     Ke[facility] Puri, [facility]

## 2025-04-17 NOTE — PLAN OF CARE
" INITIAL PSYCHOSOCIAL ASSESSMENT AND NOTE    Information for assessment was obtained from:       [x]Patient     []Parent     []Community provider    [x]Hospital records   []Other     []Guardian       Presenting Problem:  Patient is a 19 year old male who uses she/her. Patient was admitted to [facility] on 2025 Station [facility] voluntarily.    Presenting issues and presentation for admit:   Pre DEC Assessment completed on 2025:  \" Patient brought in by mother due to SI with plans and intent to ingest pesticides.  Patient researched and added pesticides to her door dash cart, but called her mother to take her in.  Patient's last suicide attempt was 6 years ago.  Patient performed NSSI by scratching arm with a plastic knife.  Patient denied HI.  Patient was mildly anxious, but friendly  and cooperative.  She was not aggressive. She said, \"I'm afraid of suffering and I want a quick way to end it.  I got distracted for a moment by a bunch of drama going on with friends.  Two of my friends have been together, but they didn't tell me. My girlfriend of two years broke up with me almost two weeks ago.  I lost my job and I'm on unemployment that's about to run out.  I'm worried about money.  I'm stressed about the state of the world.  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  I haven't been able to afford mental health care.  My dad , last year.  I entire life has been a series of horrible things, most of which are my fault.\"  Patient has not been sleeping well.  She has nightmares and flashbacks of things related to her father; as well as, other scary prospects.  Patient did not have psychosis symptoms.  She feels increased paranoia, especially about bugs and rodents.  Patient has not been eating well.  She weighs 500 pounds.  She has many aches and pains.  Her insight, judgment, and impulse control were impaired..\"       The following areas have been " assessed:    History of Mental Health and Chemical Dependency:  Mental Health History:  Patient has a historical diagnosis of ADHD and depression .   The patient has a history of suicide attempts.   Patient  has a history of engaged in non-suicidal self-injury via scratching with a plastic knife.     Previous psychiatric hospitalizations and treatments (including outpatient, residential, and inpatient care:  Pre chart review, this is Shelley's first inpatient hospitalization. She has previously engaged in PHP, day treatment, and medication management.       Substance Use History  Pre chart review, Shelley reports a history of alcohol abuse. She stopped using alcohol at age 15 and denies a history of PETER treatment       Patient's current relationship status is   single.   Patient reported having zero child(jess).       Family Description (Constellation, significant information and events, Family Psychiatric History):   Pre chart review, Shelley grew up in [place]. Her father endorse alcohol use disorder; mother endorse depression and SI; and brother endorse ASD. Her parents  and  when Shelley was 3 or 4 years old. Shelley has one full biolgoical brother, one half sister, and one stepbrother.     Significant Medical issues, Life events or Trauma history:   ***      Living Situation:  Patient's current living/housing situation is staying in own home/apartment. they report that housing {IS/IS NOT:9024} stable and they {ARE/ARE NOT:9034} able to return upon discharge.       Educational Background:    Patient's highest education level was GED. Patient reports they are  able to understand written materials.     Occupational and Financial Status:     Patient is currently unemployed.  Patient reports  income is obtained through  unemployment .  Patient does identify finances as a current stressor. They are insured under Self-pay.     Occupational History:   Shelley most recently worked as an  for benefits.      Legal Concerns (current or past history):       Current Concerns:   None    Past History:   Pre chart review, Shelley was investigated by the [facility] for soliciting a minor in 2021.        Legal Status:  Voluntary      Commitment History:   There is no history of commitment        Service History:   No  history     Ethnic/Cultural/Spiritual considerations:   The patient describes their cultural background as {CDE RACE-ETHNICITY:437419}, {HETEROSEXUAL:574901}, transgender female.  Contextual influences on patient's health include severity of symptoms, lack of social support, and medication adherence concerns.   Patient identified their preferred language to be English. Patient reported they do not need the assistance of an .  Spiritual considerations include:     Social Functioning (organizations, interests, support system):   In their free time, patient reports they like to ***.      Patient identified {OP BEH SUPPORT SYSTEM:631784} as part of their support system.  Patient identified the quality of these relationships as {OP BEH SUPPORT SYSTEM QUALITY:467224}.       Current Treatment Providers are:      Other contact information (family, friends, SO) and JEAN status:   Shannan Watt, mother, [phone]       GOALS FOR HOSPITALIZATION:  What do patient want to accomplish during this hospitalization to make things better for the patient.?   Patient priorities:  The patient reported that what is most important to them is ***. They identified *** as a goal of this hospitalization.    Social Service Assessment/Plan:  Patient view:   Patient reports it is important for the care team to know ***.  Upon discharge, they anticipate needing *** set up for them.      Strengths and Assets:  The patient uses these coping skills to help with stress and hard times: ***.          Patient will have psychiatric assessment and medication management by the psychiatrist. Medications will be reviewed and adjusted  per DO/MD/APRN CNP as indicated. The treatment team will continue to assess and stabilize the patient's mental health symptoms with the use of medications and therapeutic programming. Hospital staff will provide a safe environment and a therapeutic milieu. Staff will continue to assess patient as needed. Patient will participate in unit groups and activities. Patient will receive individual and group support on the unit.      CTC will do individual inpatient treatment planning and after care planning. CTC will discuss options for increasing community supports with the patient. CTC will coordinate with outpatient providers and will place referrals to ensure appropriate follow up care is in place.

## 2025-04-17 NOTE — ED NOTES
Pt calm and coopertive with the writer this morning. Pt ate all her food for breakfast. Pt has no NV. Pt denies any pain. Pt is under 1:1 supervision at this time.   04/17/25 0827   Behavioral Health   General Appearance WDL all   General Appearance body odor;dress appropriate for weather/appropriate for setting   Behavior WDL   Behavior WDL WDL   Emotion Mood WDL   Emotion/Mood/Affect WDL X;all   Affect flat   Emotion/Mood calm;melancholic   Speech WDL   Speech WDL WDL   Perceptual State WDL   Perceptual State WDL WDL   Thought Process WDL   Thought Process WDL WDL   C-SSRS (Daily/Shift Screen)   Q2 Suicidal Thoughts (Since Last Contact) 1-->yes   Q3 Have you been thinking about how you might do this? 1-->yes   Q4 Suicidal Intent without Specific Plan 1-->yes   Q5 Suicide Intent with Specific Plan 0-->no   Q6 Suicide Behavior 0-->no   Level of Risk per Screen high risk   Interventions   Observation for High Risk 1:1 Continuous observation   Environment of Care Checklist   Potentially harmful objects out of patient reach? yes   Personal belongings secured? yes   Patient dressed in hospital-provided attire only? yes   Plastic bags out of patient reach? yes   Patient care equipment (cords, cables, call bells, lines, and drains) shortened, removed, or accounted for? yes   Potentially toxic materials removed or secured? yes   Sharps container removed or secured? yes   Cabinets secured? yes   Suicide/Homicide Risk   Suicidality thoughts only

## 2025-04-17 NOTE — MEDICATION SCRIBE - ADMISSION MEDICATION HISTORY
Medication Scribe Admission Medication History    Admission medication history is complete. The information provided in this note is only as accurate as the sources available at the time of the update.    Information Source(s): Patient and CareEverywhere/SureScripts via in-person    Pertinent Information: Patient reports not taking any medications. No recent dispense of medications. Old Medications on PTA removed.    Changes made to PTA medication list:    Added: None    Deleted: Toradol 10 mg tab                       Melatonin 10 mg tab                       Concerta 27 mg CR tab                       Zofran 4 mg tab    Changed: None    Allergies reviewed with patient and updates made in EHR: yes    Medication History Completed By: Hoda Nice 4/17/2025 9:37 AM    No outpatient medications have been marked as taking for the 4/16/25 encounter (Hospital Encounter).      
[facility]

## 2025-04-18 PROCEDURE — 90837 PSYTX W PT 60 MINUTES: CPT

## 2025-04-18 PROCEDURE — 250N000013 HC RX MED GY IP 250 OP 250 PS 637: Performed by: PSYCHIATRY & NEUROLOGY

## 2025-04-18 PROCEDURE — 99232 SBSQ HOSP IP/OBS MODERATE 35: CPT | Performed by: CLINICAL NURSE SPECIALIST

## 2025-04-18 PROCEDURE — 128N000002 HC R&B CD/MH ADOLESCENT

## 2025-04-18 PROCEDURE — 250N000013 HC RX MED GY IP 250 OP 250 PS 637: Performed by: CLINICAL NURSE SPECIALIST

## 2025-04-18 RX ORDER — HYDROXYZINE HYDROCHLORIDE 25 MG/1
25-50 TABLET, FILM COATED ORAL EVERY 4 HOURS PRN
Qty: 30 TABLET | Refills: 1 | Status: SHIPPED | OUTPATIENT
Start: 2025-04-18 | End: 2025-04-21

## 2025-04-18 RX ORDER — ESCITALOPRAM OXALATE 10 MG/1
10 TABLET ORAL DAILY
Qty: 30 TABLET | Refills: 1 | Status: SHIPPED | OUTPATIENT
Start: 2025-04-19 | End: 2025-04-21

## 2025-04-18 RX ADMIN — TRAZODONE HYDROCHLORIDE 50 MG: 50 TABLET ORAL at 22:08

## 2025-04-18 RX ADMIN — ESCITALOPRAM OXALATE 10 MG: 10 TABLET ORAL at 07:56

## 2025-04-18 ASSESSMENT — ACTIVITIES OF DAILY LIVING (ADL)
ADLS_ACUITY_SCORE: 15
LAUNDRY: WITH SUPERVISION
ADLS_ACUITY_SCORE: 15
HYGIENE/GROOMING: INDEPENDENT
ADLS_ACUITY_SCORE: 15
DRESS: INDEPENDENT;SCRUBS (BEHAVIORAL HEALTH);STREET CLOTHES
ADLS_ACUITY_SCORE: 15
ORAL_HYGIENE: INDEPENDENT
ADLS_ACUITY_SCORE: 15

## 2025-04-18 NOTE — PLAN OF CARE
BEH IP Unit Acuity Rating Score (UARS)  Patient is given one point for every criteria they meet.    CRITERIA SCORING   On a 72 hour hold, court hold, committed, stay of commitment, or revocation. 0    Patient LOS on BEH unit exceeds 20 days. 0  LOS: 1   Patient under guardianship, 55+, otherwise medically complex, or under age 11. 0   Suicide ideation without relief of precipitating factors. 1   Current plan for suicide. 0   Current plan for homicide. 0   Imminent risk or actual attempt to seriously harm another without relief of factors precipitating the attempt. 0   Severe dysfunction in daily living (ex: complete neglect for self care, extreme disruption in vegetative function, extreme deterioration in social interactions). 1   Recent (last 7 days) or current physical aggression in the ED or on unit. 0   Restraints or seclusion episode in past 72 hours. 0   Recent (last 7 days) or current verbal aggression, agitation, yelling, etc., while in the ED or unit. 0   Active psychosis. 0   Need for constant or near constant redirection (from leaving, from others, etc).  0   Intrusive or disruptive behaviors. 0   Patient requires 3 or more hours of individualized nursing care per 8-hour shift (i.e. for ADLs, meds, therapeutic interventions). 0   TOTAL 2

## 2025-04-18 NOTE — PLAN OF CARE
"  Problem: Suicide Risk  Goal: Absence of Self-Harm  Outcome: Progressing   Goal Outcome Evaluation:    Admit 4/17 Suicidal ideation.       Patient up at start of shift--eating breakfast in her room.  Sitting up.  Describes mood as \"not so good\".  However, she denies SI currently.  Denies SIB thoughts or behaviors.  Rates depression as 4. Anxiety as 6-7.  Denies hallucinations.    Voluntarily offered that she did not want to go to group.  Feels most comfortable  being by herself in her room.  And has been reclusive all morning thus far.    Compliant with morning medication.    Was advised that we would wake her for call from financial services.  She was accepting.  "

## 2025-04-18 NOTE — PLAN OF CARE
Patient was asleep for 3hrs during the shift. The patient was unable to stay asleep. She stayed up in her room through the shift and was reported to be sitting in bed. She refused medications to assist with sleep. She took a shower at about 430 am and remained in her room. RN will continue to monitor.

## 2025-04-18 NOTE — PLAN OF CARE
Team Note Due:  Friday  Assessment/Intervention/Current Symptoms and Care Coordination  Chart review and met with team, discussed pt progress, symptomology, and response to treatment.  Discussed the discharge plan and any potential impediments to discharge.    [facility] spoke with  Financial Counselor about screening pt for MA. PT's insurance ended in August of 2024. Pt will be screened for MA.     [facility] received call from  Financial Counselor stating that pt had already submitted an kassy for MA. They called [facility] together and kassy was approved.     CTC met with pt and discussed discharge planning. PT provided a more current phone number. Pt expressed how helpful this admission has been and that she did not expect it to be so helpful.     CTC requested assistance from care coordinators with scheduling therapy and med management.     Discharge Plan or Goal  Pending stabilization & development of a safe discharge plan.    Dispo Plan:Return home with outpatient providers  Discharge Date/ time:  Transportation:   AVS Completed: Yes [] No[x]  Provisional Discharge: Yes[] No[x]  IP Tracker Form Completed: []     Barriers to Discharge   Patient requires further psychiatric stabilization due to current symptomology    Referral Status  Psychiatry, Therapy, and IOP    Legal Status  Patient is voluntary        Contacts:        Upcoming Meetings and Dates/Important Information and next steps:  CTC to follow up with care coordinators
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
## 2025-04-18 NOTE — PROGRESS NOTES
"[facility] [place]   Psychiatric Progress Note        Interim History:   The patient's care was discussed with the treatment team during the daily team meeting and/or staff's chart notes were reviewed.  Staff report patient is cooperative with taking medications.     Psychiatric symptoms and interventions:     Patient reports feeling better. Patient said she is \"self reflecting\" which has improved her mood. Patient reports decreased passive suicidal thoughts. Anxiety is manageable. She does not endorses psychosis/AH or VH. Mood is stable.     Provider reviewed medications with patient. She has been cooperative with taking Lexapro. She denies side effects.     Sleep and appetite are adequate.     No behavior concerns.          Medications:     Current Facility-Administered Medications   Medication Dose Route Frequency Provider Last Rate Last Admin    escitalopram (LEXAPRO) tablet 10 mg  10 mg Oral Daily Naegele, Debra Ann, APRN CNS   10 mg at 04/18/25 0756          Allergies:     Allergies   Allergen Reactions    Cockroach Dermatitis          Labs:     Recent Results (from the past 24 hours)   Hemoglobin A1c    Collection Time: 04/17/25 12:11 PM   Result Value Ref Range    Estimated Average Glucose 120 (H) <117 mg/dL    Hemoglobin A1C 5.8 (H) <5.7 %   Lipid panel reflex to direct LDL    Collection Time: 04/17/25 12:11 PM   Result Value Ref Range    Cholesterol 153 <200 mg/dL    Triglycerides 107 <150 mg/dL    Direct Measure HDL 48 >=40 mg/dL    LDL Cholesterol Calculated 84 <100 mg/dL    Non HDL Cholesterol 105 <130 mg/dL   Urine Drug Screen Panel    Collection Time: 04/17/25  7:40 PM   Result Value Ref Range    Amphetamines Urine Screen Negative Screen Negative    Barbituates Urine Screen Negative Screen Negative    Benzodiazepine Urine Screen Negative Screen Negative    Cannabinoids Urine Screen Negative Screen Negative    Cocaine Urine Screen Negative Screen Negative    Fentanyl Qual " "Urine Screen Negative Screen Negative    Opiates Urine Screen Negative Screen Negative    PCP Urine Screen Negative Screen Negative          Psychiatric Examination:     /90 (Patient Position: Sitting, Cuff Size: Adult Large)   Pulse 103   Temp 97.4  F (36.3  C) (Temporal)   Resp 18   Ht 1.803 m (5' 11\")   Wt (!) 245.9 kg (542 lb 3.2 oz)   SpO2 95%   BMI 75.62 kg/m    Weight is 542 lbs 3.2 oz  Body mass index is 75.62 kg/m .  Orthostatic Vitals       None              Appearance: awake, alert, adequately groomed, and dressed in hospital scrubs  Attitude:  cooperative  Eye Contact:  good  Mood:  better  Affect:  appropriate and in normal range  Speech:  clear, coherent and normal prosody  Psychomotor Behavior:  no evidence of tardive dyskinesia, dystonia, or tics  Throught Process:  logical, linear, and goal oriented  Associations:  no loose associations  Thought Content:  no evidence of suicidal ideation or homicidal ideation, no auditory hallucinations present, and no visual hallucinations present  Insight:  good  Judgement:  intact  Oriented to:  time, person, and place  Attention Span and Concentration:  intact  Recent and Remote Memory:  intact    Clinical Global Impressions  First:     Most recent:            Precautions:     Behavioral Orders   Procedures    Code 1 - Restrict to Unit    [facility] Extended Care     Until discharge, Extended Care to offer psychotherapeutic services to mental health patients boarding for admission or stabilization. These services are to include but are not limited to: individual psychotherapy, diagnostic assessment, case management and care planning, safety planning, etc. This may include up to 1 visit per day. If patient is physically located at [facility] or [facility], group psychotherapy up to 2 time per day may be offered.     Routine Programming     As clinically indicated    Self Injury Precaution     scratching    Sexual precautions     [facility] was investigating Gai in 2021 for " soliciting a minor and showing them how to masturbate.    Status 15     Every 15 minutes.    Suicide precautions: Suicide Risk: HIGH     Patients on Suicide Precautions should have a Combination Diet ordered that includes a Diet selection(s) AND a Behavioral Tray selection for Safe Tray - with utensils, or Safe Tray - NO utensils       Order Specific Question:   Suicide Risk     Answer:   HIGH          DIagnoses:   Major depressive disorder severe without psychosis   General anxiety disorder   R/O cluster B traits.   ADHD  Obesity  Migraines per patient report            Plan:     Legal status: voluntary     Medication management:   -Patient will stat Lexapro 10 mg to address depressive and anxiety symptoms  -Patient can use hydroxyzine PRN for anxiety and trazodone PRN for sleep.      Medical:   Reviewed admit labs: A1C 5.8, UTOX negative    Behavioral/psychology/social:   Stabilization with medications, provide structured and supportive environment  Precautions:  SIB, suicide, sexual  Patient will complete safety plan before discharge.      Disposition:   Reason for continued hospitalization: Patient is delusional and not safe for discharge.   Provider consulted with CTC regarding discharge planning .   Discharge medications: ordered   Discharge plan: Return to home with therapy. .

## 2025-04-18 NOTE — DISCHARGE INSTRUCTIONS
Behavioral Discharge Planning and Instructions    Summary: You were admitted on 4/16/2025  due to Depression and Suicidal Ideations.  You were treated by Debra Naegele, APRN,CNP and YOUSIF Germain CNP and discharged on 04 from Young Adult to Home    Main Diagnosis:   Major depressive disorder severe without psychosis     Health Care Follow-up:     Appointment: Therapy  Date/time: Wednesday April 30th, 2025 @ 5:00 pm In person  Provider:  Keyana Berkowitz  Address: [facility], [address] [place] [address], [place] [address]  Phone: [phone]  Fax: [phone]  Note:   You will be contacted prior to the appointment to set up access to electronic health records portal. Teletherapy also available. Please call to confirm your appointment.   ** HUC to fax AVS upon discharge, please.     Appointment: Psychiatry   Date/time: Thursday May 8th, 2025 @ 3:00 pm  In person  Provider:  Kathryn VIVEROS CNP,PMP  Address: [address], [place] [address], [place] [address]  Phone: [phone]  Fax: [phone]  Note:   Before your appointment, you must speak with our Intake Department. Our intake team will attempt to contact you. If you do not hear from them within 24 hours, please call them at [phone]. If you do not speak with our Intake Department and complete the necessary paperwork they send you, we cannot see you at your scheduled appointment time.   ** HUC to fax AVS upon discharge, please.     Patient Navigation Hub:   [facility] work to be your point-of-contact for trustworthy and compassionate care from Inpatient services to [facility] Programmatic Care. We will provide resources and communication to help guide you into programmatic care. Ultimately, our goal is to be the one-stop-shop of communication, coordination, and support for your journey to programmatic care.    Phone: [phone]    Information will be faxed to  your outpatient providers to ensure a healthy continuity of care for you.     Attend all scheduled appointments with your outpatient providers. Call at least 24 hours in advance if you need to reschedule an appointment to ensure continued access to your outpatient providers.     Major Treatments, Procedures and Findings:  You were provided with: a psychiatric assessment, assessed for medical stability, medication evaluation and/or management, group therapy, individual therapy, and milieu management    Symptoms to Report: feeling more aggressive, increased confusion, losing more sleep, mood getting worse, or thoughts of suicide    Early warning signs can include: increased depression or anxiety sleep disturbances increased thoughts or behaviors of suicide or self-harm  increased unusual thinking, such as paranoia or hearing voices    Safety and Wellness:  Take all medicines as directed.  Make no changes unless your doctor suggests them.      Follow treatment recommendations.  Refrain from alcohol and non-prescribed drugs.  Ask your support system to help you reduce your access to items that could harm yourself or others. If there is a concern for safety, call 911.    Resources:   Mental Health Crisis Resources  Throughout [place]: call **CRISIS (**968401)  Crisis Text Line: is available for free, 24/7 by texting [place] to 473826  Suicide Awareness Voices of Education (SAVE) (www.save.org): 147-029-EMLZ (8975)  The National Suicide Prevention Lifeline is now: 988 Suicide and Crisis Lifeline. Call 988 anytime.  National [place] on Mental Illness (www.[place].omar.org): 398-636-2435 or 928-588-4802.  Xibq7dcit: text the word LIFE to 81705 for immediate support and crisis intervention  Mental Health Consumer/Survivor Network of [place] (www.mhcsn.net): 966.735.6421 or 154-783-0100  Mental Health Association of [place] (www.mentalhealth.org): 935.518.9167 or 629-051-6674  Peer Support Connection [place] Warmline (PSC) 1-817.546.4107 Available  from 5pm - 9am (7 days a week/365 days a year)  Call or Text [address] [place] [address] 1-784.529.7708 Community Outreach for Psych Emergencies      General Medication Instructions:   See your medication sheet(s) for instructions.   Take all medicines as directed.  Make no changes unless your doctor suggests them.   Go to all your doctor visits.  Be sure to have all your required lab tests. This way, your medicines can be refilled on time.  Do not use any drugs not prescribed by your doctor.  Avoid alcohol.    Advance Directives:   Scanned document on file with [employer]? No scanned doc  Is document scanned? Pt states no documents  Honoring Choices Your Rights Handout: Informed and given  Was more information offered? Pt declined    The Treatment team has appreciated the opportunity to work with you. If you have any questions or concerns about your recent admission, you can contact the unit which can receive your call 24 hours a day, 7 days a week. They will be able to get in touch with a Provider if needed. The unit number is [phone] .

## 2025-04-18 NOTE — PLAN OF CARE
Care Coordinator Note(s):    Care Request(s):   Therapy  Preferences: Time Frame: 1 Week, PM, Any Appointment Type (In Person, Virtual, Telephone), Female Provider  Notes: Pt would like a provider who can do both in person and virtual. PT stated they work best with female providers. It would be a plus if they are queer or queer affirming. Likely discharge 4/21 or 4/22    Psychiatry   Preferences: Time Frame: 3 Weeks, PM, Any Appointment Type (In Person, Virtual, Telephone), Female Provider  Notes: Pt would like a provider who can do both in person and virtual. PT stated they work best with female providers. It would be a plus if they are queer or queer affirming. Likely discharge 4/21 or 4/22      Care Outcome(s):    CC Progress Note(s)/ Documentation:      Appointment: Therapy  Date/time: Wednesday April 30th, 2025 @ 5:00 pm In person  Provider:  Keyana Berkowitz  Address: [facility], [address] [place] [address], [facility] [address], [place] [address]  Phone: [phone]  Fax: [phone]  Note:   You will be contacted prior to the appointment to set up access to electronic health records portal. Teletherapy also available. Please call to confirm your appointment.      Appointment: Psychiatry   Date/time: Thursday May 8th, 2025 @ 3:00 pm  In person  Provider:  Kathryn VIVEROS CNP,PMHNP  Address: [address], [address] [address]  Phone: [phone]  Fax: [phone]  Note:   Before your appointment, you must speak with our Intake Department. Our intake team will attempt to contact you. If you do not hear from them within 24 hours, please call them at [phone]. If you do not speak with our Intake Department and complete the necessary paperwork they send you, we cannot see you at your scheduled appointment time.      -Caroline Monreal  Adult Behavioral Health Care Coordinator

## 2025-04-18 NOTE — PROGRESS NOTES
"CLINICAL NUTRITION SERVICES - ASSESSMENT NOTE    RECOMMENDATIONS FOR MDs/PROVIDERS TO ORDER:  Recommend referral for ED assessment, pt reports receiving diagnosis of BED and is open to treatment.     Registered Dietitian Interventions:  -Double protein with meals  -Provided education on prediabetes and healthy diet for blood sugar and weight management    Future/Additional Recommendations:  RD to sign off at this time, but will remain available by consult if new nutrition problem arises.       REASON FOR ASSESSMENT  Provider order - morbidly obese    INFORMATION OBTAINED  Assessed patient in room.    NUTRITION HISTORY  Pt reports chronic struggles with binge eating and restriction r/t body dysmorphia and mental health issues. Pt has also been experiencing food insecurity making it more difficult to purchase healthy foods. Pt often finds she does not have the motivation to get out of bed to eat/cook and will go full days without eating while other days she will overeat. Pt reports her PCP recommended Rx for Semiglutide, however, insurance denied the request.     CURRENT NUTRITION ORDERS  Diet: Regular  Snacks/Supplements:  none     Allergies: NKFA    CURRENT INTAKE/TOLERANCE  Eating and drinking adequately per RN notes.     LABS  Nutrition-relevant labs:    04/17/25 12:11   Hemoglobin A1C 5.8 (H)     MEDICATIONS  Nutrition-relevant medications:  Lexapro    ANTHROPOMETRICS  Height: 180.3 cm (5' 11\")  Admission Weight: 226.8 kg (500 lb) (04/16/25 1955)   Most Recent Weight: 245.9 kg (542 lb 3.2 oz) (04/17/25 1145)  IBW: 78.2 kg   BMI: Body mass index is 75.62 kg/m .   Weight History:   Wt Readings from Last 15 Encounters:   04/17/25 245.9 kg (542 lb 3.2 oz)   08/19/24 255.4 kg (563 lb)      08/12/24 226.8 kg (500 lb)   01/11/24 211.8 kg (467 lb)      11/09/23 210 kg (463 lb)      03/07/23 204.1 kg (450 lb)      04/03/19 110.2 kg (243 lb) (>99%, Z= 2.90)*     * Growth percentiles are based on CDC (Boys, 2-20 Years) " data.   No recent weight loss noted    Dosing Weight: 78 kg, based on ideal wt    ASSESSED NUTRITION NEEDS  Estimated Energy Needs: 1560 - 1950 kcals/day (20 - 25 kcals/kg)  Justification: Obese  Estimated Protein Needs: 117 - 156 grams protein/day (1.5 - 2 grams of pro/kg)  Justification: Obesity guidelines  Estimated Fluid Needs: 1 mL/kcal  Justification: Maintenance    SYSTEM AND PHYSICAL FINDINGS    GI symptoms: Reviewed  Skin/wounds: Reviewed    MALNUTRITION  % Intake: No decreased intake noted  % Weight Loss: None noted  Subcutaneous Fat Loss: None observed  Muscle Loss: None observed  Fluid Accumulation/Edema: None noted  Malnutrition Diagnosis: Patient does not meet two of the established criteria necessary for diagnosing malnutrition  Malnutrition Present on Admission: No    NUTRITION DIAGNOSIS  No nutrition diagnosis at this time     INTERVENTIONS  Manage composition of oral intake  Nutrition education content    GOALS  Patient to consume % of nutritionally adequate meal trays TID, or the equivalent with supplements/snacks.     MONITORING/EVALUATION  RD to sign off at this time, but will remain available by consult if new nutrition problem arises.      Stephanie Bocanegra MPH, RDN, LD  Behavioral Health Adult & Pediatric Dietitian  BEH Clinical Dietitian Angel, Teams, or Desk: [phone]  Weekend/Holiday Angel: Weekend Holiday Clinical Dietitian [Multi Site Groups]

## 2025-04-18 NOTE — PLAN OF CARE
"Problem: Psychotic Symptoms  Goal: Psychotic Symptoms  Description: Signs and symptoms of listed problems will be absent or manageable.  Outcome: Progressing   Goal Outcome Evaluation:    Plan of Care Reviewed With: patient      Pt presents as calm, withdrawn. Pt spends most of the evening in their room. Pt reports feeling fine. She denies depression this evening. She reports anxiety is \"always there.\" She denies SI/SIB/HI, hallucinations. No other safety or behavioral concerns this shift. Pt had a visit from mom this evening.    No scheduled evening medications. Pt requested prn trazodone for sleep. Pt denies pain at this time.     "

## 2025-04-19 PROCEDURE — 250N000013 HC RX MED GY IP 250 OP 250 PS 637: Performed by: PSYCHIATRY & NEUROLOGY

## 2025-04-19 PROCEDURE — 250N000013 HC RX MED GY IP 250 OP 250 PS 637: Performed by: REGISTERED NURSE

## 2025-04-19 PROCEDURE — 128N000002 HC R&B CD/MH ADOLESCENT

## 2025-04-19 PROCEDURE — 250N000013 HC RX MED GY IP 250 OP 250 PS 637: Performed by: CLINICAL NURSE SPECIALIST

## 2025-04-19 RX ADMIN — SALINE NASAL SPRAY 1 SPRAY: 1.5 SOLUTION NASAL at 17:50

## 2025-04-19 RX ADMIN — TRAZODONE HYDROCHLORIDE 50 MG: 50 TABLET ORAL at 21:35

## 2025-04-19 RX ADMIN — ACETAMINOPHEN 650 MG: 325 TABLET, FILM COATED ORAL at 21:35

## 2025-04-19 RX ADMIN — ACETAMINOPHEN 650 MG: 325 TABLET, FILM COATED ORAL at 07:40

## 2025-04-19 RX ADMIN — ESCITALOPRAM OXALATE 10 MG: 10 TABLET ORAL at 07:40

## 2025-04-19 ASSESSMENT — ACTIVITIES OF DAILY LIVING (ADL)
ADLS_ACUITY_SCORE: 15
LAUNDRY: WITH SUPERVISION
ADLS_ACUITY_SCORE: 15
ORAL_HYGIENE: INDEPENDENT
HYGIENE/GROOMING: INDEPENDENT
ADLS_ACUITY_SCORE: 15
DRESS: STREET CLOTHES
ADLS_ACUITY_SCORE: 15

## 2025-04-19 NOTE — PLAN OF CARE
Problem: Depressive Symptoms  Goal: Depressive Symptoms  Description: Signs and symptoms of listed problems will be absent or manageable.  Outcome: Progressing   Goal Outcome Evaluation:    Plan of Care Reviewed With: patient      Pt presents as calm, withdrawn. She spends most of the evening in her room. Pt reports her mental health is good. She denies SI/SIB/HI or related behavioral concerns. Pt stated she believes she has autism and that is her main concern. Pt completed psych testing this evening.     Pt reported bloody nose and dryness; prn order obtained, saline nasal spray given. Pt given prn tylenol for ongoing leg soreness which pt reports somewhat helpful. Pt given prn trazodone at bedtime.

## 2025-04-19 NOTE — PLAN OF CARE
The patient was asleep for 4.75hrs during the shift. Patient was awake during the start of the shift. Patient had no behavioral or medical concerns and remain on 15min checks

## 2025-04-19 NOTE — PLAN OF CARE
"  Problem: Suicide Risk  Goal: Absence of Self-Harm  Description: Pt will remain safe on the unit as evidenced by maintaining ADL's independently, identify and utilize 3 coping skills, attend 50% of programming and timely inform staff if not feel safe and/or have difficulty managing emotions or disturbing thoughts   Outcome: Progressing   Goal Outcome Evaluation:          Mental Health:  Pt presents to nursing desk requesting prn Tylenol, \"I have come to  with myself and really needed to be alone without stimuli or people,\" Pt affect brightens with engagement laughing at self; reports feeling less sad/depressed/anxious, denies any SI/SIB thoughts or hallucinations, pt able to identify the reflection from the panel of the overhead light as a trigger related to body dysmorphia and poor self esteem/image; \"it is more helpful to not go to groups because I tend to be concerned about the needs of others more than myself,\" Pt expressed [facility] to [facility] and writer encouraged this-pt able to do this most of the morning prior to other patient awakening and even socializing 1:1 with staff more. Pt able to verbalize needs, reports less perseverative on \"negativity,\" and requesting the opportunity to have neuro-psych testing done. \"It was recommended to me at age 13 at tail end of my inpatient hospital stay, but I declined and have always wondered if it would be more beneficial for my diagnosis,\" pt goes on to say. Pt more forthcoming about sharing own experiences, thoughts of self, trauma and dysfunctional family upbringing. Pt reports enjoying being out of her room instead of spending time working on new word finds in the room; Pt pleasant, cooperative and safe; pt doing well, and appears to have much improved coping and less anxiety. Psych testing ordered by O/C provider following consult and was going to start pt on test, but sleeping in afternoon during visiting hours.     Medical:  Pt " "eating and drinking fluids; reports, \"feel all rested up and now I tend to be more awake in the early morning,\"  \"Chronic knee pain, I did squats last night so my thighs are sore is all, generalized, Tylenol helps,\" pt reports.     Vitals:   Blood pressure slightly elevated, but appears to be consistent.     PRNs Given:  Tylenol 650 mg at 7:45 am/knee/thigh pain helpful.    Continue to assess and monitor closely, 15-minute checks, no roommate, sexual, suicide and self injury precautions for safety.                  "

## 2025-04-20 PROCEDURE — 128N000002 HC R&B CD/MH ADOLESCENT

## 2025-04-20 PROCEDURE — 250N000013 HC RX MED GY IP 250 OP 250 PS 637: Performed by: PSYCHIATRY & NEUROLOGY

## 2025-04-20 PROCEDURE — 250N000013 HC RX MED GY IP 250 OP 250 PS 637: Performed by: CLINICAL NURSE SPECIALIST

## 2025-04-20 RX ADMIN — ESCITALOPRAM OXALATE 10 MG: 10 TABLET ORAL at 08:36

## 2025-04-20 RX ADMIN — ACETAMINOPHEN 650 MG: 325 TABLET, FILM COATED ORAL at 22:32

## 2025-04-20 RX ADMIN — ACETAMINOPHEN 650 MG: 325 TABLET, FILM COATED ORAL at 08:36

## 2025-04-20 RX ADMIN — TRAZODONE HYDROCHLORIDE 50 MG: 50 TABLET ORAL at 22:32

## 2025-04-20 ASSESSMENT — ACTIVITIES OF DAILY LIVING (ADL)
ADLS_ACUITY_SCORE: 15

## 2025-04-20 NOTE — PLAN OF CARE
Problem: Suicide Risk  Goal: Absence of Self-Harm  Description: Pt will remain safe on the unit as evidenced by maintaining ADL's independently, identify and utilize 3 coping skills, attend 50% of programming and timely inform staff if not feel safe and/or have difficulty managing emotions or disturbing thoughts   Outcome: Progressing   Goal Outcome Evaluation:    Plan of Care Reviewed With: patient      Pt presents as calm, withdrawn. She spends most of the evening in her room. She does not attend groups. She was out to the [facility] for visiting with her mom. She mentions that she is hopeful she will discharge after psych testing results this week. Pt reports minimal anxiety. Pt denies all other mental health concerns. No SI/SIB/HI or other safety concerns this shift.     Pt given prn trazodone at bedtime. Pt reported ongoing leg soreness 4/10, given prn tylenol before bed.

## 2025-04-20 NOTE — PLAN OF CARE
Problem: Sleep Disturbance  Goal: Adequate Sleep/Rest  Outcome: Not Progressing   Goal Outcome Evaluation:    Patient slept for 5.25 hours  last night.  No pain verbalized. Safety checks done per protocol. No occurrences. No behaviors or safety concerns at this time. Will continue to monitor.

## 2025-04-20 NOTE — PLAN OF CARE
"  Problem: Depressive Symptoms  Goal: Social and Therapeutic (Depression)  Description: Pt will remain safe on the unit as evidenced by maintaining independence in ADL's, utilize 3 coping skills, complete psych testing, safety plan and timely inform staff if not feel safe and/or have difficulty managing emotions or disturbing thoughts   Outcome: Progressing   Goal Outcome Evaluation:  Plan of Care Reviewed With: patient           Mental Health:  Pt presents as blunt/flat affect, pleasant, sits in lounge for meals and to watch television; pt rates depression 2/10 and anxiety 6/10,10 being worst, pt normally anxious and, does \"not do groups\" engaging appropriately 1:1, thoughts are clear/intact, denies any SI/SIB thoughts or hallucinations; med compliant-no observed or reported side effects. Independent with ADL's, remains cooperative with no safety concerns. Completed [place] and MMPI tests faced to psychometric testing per [facility]. Pt resting in bed most of the afternoon.  Medical:  Pt continues to endorse \"thigh\" pain, \"still sore, both sides, little less,\" appetite and sleep are improved.     Vitals:   BP (!) 146/90 (Patient Position: Sitting, Cuff Size: Adult Large)   Pulse 80   Temp 97.2  F (36.2  C) (Temporal)   Resp 18   Ht 1.803 m (5' 11\")   Wt (!) 245.9 kg (542 lb 3.2 oz)   SpO2 (!) 10%   BMI 75.62 kg/m         PRNs Given:  Tylenol 650 mg at 8:30 am helpful for pain per pt report.    Continue to assess and monitor closely, no roommate, suicide and self injury precautions for safety.           "

## 2025-04-21 VITALS
SYSTOLIC BLOOD PRESSURE: 148 MMHG | TEMPERATURE: 96.4 F | HEART RATE: 77 BPM | WEIGHT: 315 LBS | BODY MASS INDEX: 44.1 KG/M2 | OXYGEN SATURATION: 99 % | HEIGHT: 71 IN | RESPIRATION RATE: 18 BRPM | DIASTOLIC BLOOD PRESSURE: 92 MMHG

## 2025-04-21 PROCEDURE — 99238 HOSP IP/OBS DSCHRG MGMT 30/<: CPT | Performed by: NURSE PRACTITIONER

## 2025-04-21 PROCEDURE — 90834 PSYTX W PT 45 MINUTES: CPT

## 2025-04-21 PROCEDURE — 250N000013 HC RX MED GY IP 250 OP 250 PS 637: Performed by: CLINICAL NURSE SPECIALIST

## 2025-04-21 RX ORDER — HYDROXYZINE HYDROCHLORIDE 25 MG/1
25-50 TABLET, FILM COATED ORAL EVERY 4 HOURS PRN
Qty: 30 TABLET | Refills: 1 | Status: SHIPPED | OUTPATIENT
Start: 2025-04-21

## 2025-04-21 RX ORDER — ESCITALOPRAM OXALATE 10 MG/1
10 TABLET ORAL DAILY
Qty: 30 TABLET | Refills: 1 | Status: SHIPPED | OUTPATIENT
Start: 2025-04-21

## 2025-04-21 RX ADMIN — ESCITALOPRAM OXALATE 10 MG: 10 TABLET ORAL at 08:26

## 2025-04-21 ASSESSMENT — ACTIVITIES OF DAILY LIVING (ADL)
ADLS_ACUITY_SCORE: 15

## 2025-04-21 NOTE — PLAN OF CARE
Problem: Sleep Disturbance  Goal: Adequate Sleep/Rest  Outcome: Progressing   Goal Outcome Evaluation:    Patient slept for 5.5 hours last night. Breathing noted even and unlabored. No indication of pain or discomfort. Safety rounds completed per routine. No occurrences. No behavior or safety concerns at this time. Will continue to monitor and offer support.

## 2025-04-21 NOTE — PROGRESS NOTES
Pt discharged to home via private car (mom). Denies SI/HI/SIB. Discharge summary reviewed. Outpatient crisis resources identified. Verbalizes understanding of all discharge instructions, recommendations and medications.

## 2025-04-21 NOTE — PLAN OF CARE
BEH IP Unit Acuity Rating Score (UARS)  Patient is given one point for every criteria they meet.    CRITERIA SCORING   On a 72 hour hold, court hold, committed, stay of commitment, or revocation. 0    Patient LOS on BEH unit exceeds 20 days. 0  LOS: 4   Patient under guardianship, 55+, otherwise medically complex, or under age 11. 0   Suicide ideation without relief of precipitating factors. 1   Current plan for suicide. 0   Current plan for homicide. 0   Imminent risk or actual attempt to seriously harm another without relief of factors precipitating the attempt. 0   Severe dysfunction in daily living (ex: complete neglect for self care, extreme disruption in vegetative function, extreme deterioration in social interactions). 1   Recent (last 7 days) or current physical aggression in the ED or on unit. 0   Restraints or seclusion episode in past 72 hours. 0   Recent (last 7 days) or current verbal aggression, agitation, yelling, etc., while in the ED or unit. 0   Active psychosis. 0   Need for constant or near constant redirection (from leaving, from others, etc).  0   Intrusive or disruptive behaviors. 0   Patient requires 3 or more hours of individualized nursing care per 8-hour shift (i.e. for ADLs, meds, therapeutic interventions). 0   TOTAL 2

## 2025-04-21 NOTE — PLAN OF CARE
Team Note Due:  Friday  Assessment/Intervention/Current Symptoms and Care Coordination  Chart review and met with team, discussed pt progress, symptomology, and response to treatment.  Discussed the discharge plan and any potential impediments to discharge.    CTC coordinated with unit staff for pt's discharge.     Discharge Plan or Goal      Dispo Plan:Return home with outpatient providers  Discharge Date/ time: 4/21 at 1pm  Transportation: Mom to   AVS Completed: Yes [x] No[]  Provisional Discharge: Yes[] No[x]  IP Tracker Form Completed: [x]     Barriers to Discharge       Referral Status  Appointment: Therapy  Date/time: Wednesday April 30th, 2025 @ 5:00 pm In person    Appointment: Psychiatry   Date/time: Thursday May 8th, 2025 @ 3:00 pm  In person     Legal Status  Patient is voluntary        Contacts:        Upcoming Meetings and Dates/Important Information and next steps:

## 2025-04-21 NOTE — PLAN OF CARE
"Individual Therapy Note      Date of Service: April 21, 2025    Patient: Shelley goes by \"Shelley,\" uses she/her pronouns    Individuals Present: Shelley Екатерина [facility]    Session start: 1025  Session end: 1105  Session duration in minutes: 40      Modality Used: Person Centered and Rapport Building    Goals: Improve self compassion    Patient Description of current symptoms: anxious, but ready to discharge     Mental Status Exam:   Attitude: cooperative  Eye Contact: good  Mood: better  Affect: appropriate and in normal range  Speech: clear, coherent  Psychomotor Behavior: no evidence of tardive dyskinesia, dystonia, or tics  Thought Process:  logical  Associations: no loose associations  Thought Content: no evidence of suicidal ideation or homicidal ideation  Insight: good  Judgement: intact  Attention Span and Concentration: intact    Pt progress: Patient reported feeling anxious, but ready to discharge. She wanted to do psych testing, but didn't want to wait an extra day. She is feeling bored on the unit. She has started coming out of her room and hanging in the milieu to watch TV. She couldn't sleep anymore, so decided on a change of scenery rather than staying in her room. She hasn't connected with others while here nor gone to groups. We discussed future plans. She is running out of unemployment benefits soon and we discussed options to earn income. She wants to research [employer] with a dream to write a book or do freelance work. She reported that her mom told her she might be able to get her dad's disability or get grievance pay from her last employer.Patient want to get a therapist and work on being nice to herself, not letting others walk all over her and accepting help from others. Patient would like to have one close friend. She reported that she is not great at making friends. We discussed what she likes and does not like about herself. She reported she used to be a manipulative person and did bad " things. She likes her ability to connect with people and take on their emotional burdens. She considers herself a crossroads friend who brings other people together. Writer offered to do an exercise in self compassion with the patient or give her the hand out to do on her own. She opted to do on her own. Writer gave her the handouts and ee closed out therapy.    Treatment Objective(s) Addressed:   The focus of this session was on rapport building, identifying and practicing coping strategies, processing feelings related to discharge, identifying an appropriate aftercare plan, and building self-esteem     Progress Towards Goals and Assessment of Patient:   Patient is making progress towards treatment goals as evidenced by discharging today.       Therapeutic Intervention(s):   Provided active listening, unconditional positive regard, and validation.   Engaged in guided discovery, explored patient's perspectives and helped expand them through socratic dialogue    Safety Plan: completed with patient    Plan/next step: No further intervention    09826 - Psychotherapy (with patient) - 45 (38-52*) min    Patient Active Problem List   Diagnosis    Depression    Suicidal ideation

## 2025-04-21 NOTE — PLAN OF CARE
The patient was asleep for 5.5hrs during the shift. The patient did not have any behavioral or medical concerns and remain on 15min check. RN will continue to monitor.

## 2025-04-21 NOTE — DISCHARGE SUMMARY
"Psychiatric Discharge Summary    Eileen Watt MRN# [id number]   Age: 19 year old YOB: 2005     Date of Admission:  4/16/2025  Date of Discharge:  4/21/2025  Admitting Physician:  Rikki Armas MD  Discharge Physician:  YOUSIF Vegas CNP          Event Leading to Hospitalization:   Eileen Watt \"Shelley\" is a 19 year adult who prefers she/her pronouns, is presenting with history of depression, anxiety and suicidal ideation. Patient report multiple stressors including feeling abandoned by her friends, break up with partner and fired from job 2 weeks ago. Patient reports history of chronic suicidal thinking. Patient stopped taking bupropion 2 weeks ago because her suicidal thinking increased. Patient is interested in medication and treatment.      DEC Assessment  Eileen Watt presents to the ED with family/friends. Patient is presenting to the ED for the following concerns: Suicidal ideation, Depression, Anxiety. Factors that make the mental health crisis life threatening or complex are: Patient brought in by mother due to SI with plans and intent to ingest pesticides.  Patient researched and added pesticides to her door dash cart, but called her mother to take her in.  Patient's last suicide attempt was 6 years ago.  Patient performed NSSI by scratching arm with a plastic knife.  Patient denied HI.  Patient was mildly anxious, but friendly  and cooperative.  She was not aggressive. She said, \"I'm afraid of suffering and I want a quick way to end it.  I got distracted for a moment by a bunch of drama going on with friends.  Two of my friends have been together, but they didn't tell me. My girlfriend of two years broke up with me almost two weeks ago.  I lost my job and I'm on unemployment that's about to run out.  I'm worried about money.  I'm stressed about the state of the world.  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  I haven't been able to afford mental health care.  My dad " ", last year.  I entire life has been a series of horrible things, most of which are my fault.\"  Patient has not been sleeping well.  She has nightmares and flashbacks of things related to her father; as well as, other scary prospects.  Patient did not have psychosis symptoms.  She feels increased paranoia, especially about bugs and rodents.  Patient has not been eating well.  She weighs 500 pounds.  She has many aches and pains.  Her insight, judgment, and impulse control were impaired.  Goal for this hospitalization: starting medications therapy and \"feeling better.\".          See Admission note by Naegele, Debra Ann, APRN CNS  for additional details.          Diagnoses:     Suicidal ideation, resolved  Major depressive disorder severe without psychosis   General anxiety disorder   R/O cluster B traits.   ADHD  Obesity  Migraines per patient reprot     # Severe Obesity: Estimated body mass index is 75.62 kg/m  as calculated from the following:    Height as of this encounter: 1.803 m (5' 11\").    Weight as of this encounter: 245.9 kg (542 lb 3.2 oz).      # Financial/Environmental Concerns: unemployed                Labs:        Lab Results   Component Value Date     2024    Lab Results   Component Value Date    CHLORIDE 104 2024    Lab Results   Component Value Date    BUN 9.5 2024      Lab Results   Component Value Date    POTASSIUM 4.2 2024    Lab Results   Component Value Date    CO2 25 2024    Lab Results   Component Value Date    CR 0.84 2024          Lab Results   Component Value Date    WBC 8.5 2024    HGB 11.5 (L) 2024    HCT 35.7 (L) 2024    MCV 84 2024     2024     Lab Results   Component Value Date    AST 25 2024    ALT 45 2024    ALKPHOS 96 2024    BILITOTAL 0.5 2024     No results found for: \"TSH\"         Consults:   No consultations were requested during this admission         Hospital Course: "   Eileen Watt was admitted to [facility] with attending Humza DODD CNP, as a voluntary patient. The patient was placed under status 15 (15 minute checks) to ensure patient safety.     The patient tolerated medications well. Reported mood symptoms improved.  Visible in the milieu but kept herself.  He watched TV with others.  Did not attend any groups.  Did not socialize with others.  No overt ankit, confusion or psychosis noted. The patient maintained denial of SI, HI and XIANG. The patient reported improvement in depression and anxiety.  Future oriented, feeling hopeful for the future. The patient slept well. Appetite was intact. The patient was compliant with medications and care.     The patient had [place] and MMPI test ordered.  She completed the paper part of it.  She did not will await and meet with Dr. Claudio.  The patient was discharged to home.  Her mom picked her up.    Eileen Watt was released to home. At the time of this encounter, Eileen Watt was determined to not be a danger to himself or others and symptoms did not meet criteria for involuntary hospitalization.      Safety plan, post discharge recommendations and relapse prevention were discussed with the patient. The patient agreed to call 911 or present to ED if symptoms worsen or developed thoughts of suicide, self harm or homicide.  The patient agreed to continue medications and outpatient care.         Discharge Medications:     Discharge Medication List as of 4/21/2025  1:25 PM        CONTINUE these medications which have CHANGED    Details   escitalopram (LEXAPRO) 10 MG tablet Take 1 tablet (10 mg) by mouth daily., Disp-30 tablet, R-1, E-Prescribe      hydrOXYzine HCl (ATARAX) 25 MG tablet Take 1-2 tablets (25-50 mg) by mouth every 4 hours as needed for anxiety., Disp-30 tablet, R-1, E-Prescribe                  Psychiatric Examination:   Appearance:  awake, alert and adequately groomed  Attitude:  cooperative  Eye  Contact:  good  Mood:  anxious, depressed, and better  Affect:  appropriate and in normal range  Speech:  clear, coherent  Psychomotor Behavior:  no evidence of tardive dyskinesia, dystonia, or tics  Thought Process:  logical and goal oriented  Associations:  no loose associations  Thought Content:  no evidence of suicidal ideation or homicidal ideation, no auditory hallucinations present, and no visual hallucinations present  Insight:  fair  Judgment:  fair  Oriented to:  time, person, and place  Attention Span and Concentration:  fair  Recent and Remote Memory:  fair  Language: Able to name objects  Fund of Knowledge: appropriate  Muscle Strength and Tone: normal  Gait and Station: Normal         Discharge Plan:   The following medication changes took place:   Lexapro 10 mg, qam for depression and anxiety.     Follow up with your outpatient provider/team.    Appointment: Therapy  Date/time: Wednesday April 30th, 2025 @ 5:00 pm In person  Provider:  Keyana Berkowitz  Address: [facility], [address] [place] [id number]  Phone: [phone]  Fax: [phone]  Note:   You will be contacted prior to the appointment to set up access to electronic health records portal. Teletherapy also available. Please call to confirm your appointment.  [facility] to fax AVS upon discharge, please.      Appointment: Psychiatry   Date/time: Thursday May 8th, 2025 @ 3:00 pm  In person  Provider:  Kathryn VIVEROS CNP,PMHNP  Address: [address] , [place] [address], [place] [address]  Phone: [phone]  Fax: [phone]  Note:   Before your appointment, you must speak with our Intake Department. Our intake team will attempt to contact you. If you do not hear from them within 24 hours, please call them at [phone]. If you do not speak with our Intake Department and complete the necessary paperwork they send you, we cannot see you at your scheduled appointment time.   [facility]  to fax AVS upon discharge, please.        Attestation:  The patient has been seen and evaluated by me,  Humza DODD, CNP

## 2025-04-21 NOTE — PLAN OF CARE
IP Treatment Plan    Client's Name: Eileen Watt  YOB: 2005      Treatment Plan Date: April 21, 2025      Anticipated number of sessions for this episode of care: 2    Current Concerns/Problem Areas:    Goal 1: Pt will decrease frequency, intensity and duration of their depression to increase improvement in their daily functioning Establish and develop 1 new coping skill for depression to improve mood and overall well-beingIncrease social interaction by 50% per patient report from a current 0.      Intervention(s)    Engaged in safety planning identifying coping skills, warning signs, health support and resources, Engaged in cognitive restructuring/ reframing, looked at common cognitive distortions and challenged negative thoughts, Identified and practiced coping skills, Explored strategies for self-soothing, and Engaged in guided discovery, explored patient's perspectives and helped expand them through socratic dialogue        Pt centered considerations  Pt considerations transgender male to female

## 2025-06-07 ENCOUNTER — HEALTH MAINTENANCE LETTER (OUTPATIENT)
Age: 20
End: 2025-06-07

## 2025-06-16 ENCOUNTER — PATIENT OUTREACH (OUTPATIENT)
Dept: CARE COORDINATION | Facility: CLINIC | Age: 20
End: 2025-06-16
Payer: MEDICAID

## 2025-06-18 ENCOUNTER — PATIENT OUTREACH (OUTPATIENT)
Dept: CARE COORDINATION | Facility: CLINIC | Age: 20
End: 2025-06-18
Payer: MEDICAID